# Patient Record
Sex: MALE | Race: WHITE | NOT HISPANIC OR LATINO | Employment: OTHER | URBAN - METROPOLITAN AREA
[De-identification: names, ages, dates, MRNs, and addresses within clinical notes are randomized per-mention and may not be internally consistent; named-entity substitution may affect disease eponyms.]

---

## 2018-04-06 ENCOUNTER — OFFICE VISIT CONVERTED (OUTPATIENT)
Dept: ORTHOPEDIC SURGERY | Facility: CLINIC | Age: 43
End: 2018-04-06
Attending: ORTHOPAEDIC SURGERY

## 2018-04-06 ENCOUNTER — CONVERSION ENCOUNTER (OUTPATIENT)
Dept: ORTHOPEDIC SURGERY | Facility: CLINIC | Age: 43
End: 2018-04-06

## 2018-04-16 ENCOUNTER — OFFICE VISIT CONVERTED (OUTPATIENT)
Dept: ORTHOPEDIC SURGERY | Facility: CLINIC | Age: 43
End: 2018-04-16
Attending: ORTHOPAEDIC SURGERY

## 2018-04-16 ENCOUNTER — CONVERSION ENCOUNTER (OUTPATIENT)
Dept: ORTHOPEDIC SURGERY | Facility: CLINIC | Age: 43
End: 2018-04-16

## 2018-04-25 ENCOUNTER — OFFICE VISIT CONVERTED (OUTPATIENT)
Dept: ORTHOPEDIC SURGERY | Facility: CLINIC | Age: 43
End: 2018-04-25
Attending: PHYSICIAN ASSISTANT

## 2018-06-13 ENCOUNTER — CONVERSION ENCOUNTER (OUTPATIENT)
Dept: ORTHOPEDIC SURGERY | Facility: CLINIC | Age: 43
End: 2018-06-13

## 2018-06-13 ENCOUNTER — OFFICE VISIT CONVERTED (OUTPATIENT)
Dept: ORTHOPEDIC SURGERY | Facility: CLINIC | Age: 43
End: 2018-06-13
Attending: PHYSICIAN ASSISTANT

## 2019-03-06 ENCOUNTER — OFFICE VISIT CONVERTED (OUTPATIENT)
Dept: ORTHOPEDIC SURGERY | Facility: CLINIC | Age: 44
End: 2019-03-06
Attending: ORTHOPAEDIC SURGERY

## 2019-05-09 ENCOUNTER — HOSPITAL ENCOUNTER (OUTPATIENT)
Dept: ULTRASOUND IMAGING | Facility: HOSPITAL | Age: 44
Discharge: HOME | End: 2019-05-09
Attending: INTERNAL MEDICINE

## 2019-05-09 LAB
ALBUMIN SERPL-MCNC: 5.1 G/DL (ref 3.5–5)
ALBUMIN/GLOB SERPL: 1.5 {RATIO} (ref 1.4–2.6)
ALP SERPL-CCNC: 94 U/L (ref 53–128)
ALT SERPL-CCNC: 126 U/L (ref 10–40)
ANION GAP SERPL CALC-SCNC: 19 MMOL/L (ref 8–19)
AST SERPL-CCNC: 68 U/L (ref 15–50)
BILIRUB SERPL-MCNC: 0.63 MG/DL (ref 0.2–1.3)
BUN SERPL-MCNC: 14 MG/DL (ref 5–25)
BUN/CREAT SERPL: 15 {RATIO} (ref 6–20)
CALCIUM SERPL-MCNC: 9.8 MG/DL (ref 8.7–10.4)
CHLORIDE SERPL-SCNC: 101 MMOL/L (ref 99–111)
CONV AFP: 2.3 NG/ML (ref 0–8.7)
CONV CO2: 25 MMOL/L (ref 22–32)
CONV TOTAL PROTEIN: 8.4 G/DL (ref 6.3–8.2)
CREAT UR-MCNC: 0.95 MG/DL (ref 0.7–1.2)
FERRITIN SERPL-MCNC: 467 NG/ML (ref 30–300)
GFR SERPLBLD BASED ON 1.73 SQ M-ARVRAT: >60 ML/MIN/{1.73_M2}
GLOBULIN UR ELPH-MCNC: 3.3 G/DL (ref 2–3.5)
GLUCOSE SERPL-MCNC: 94 MG/DL (ref 70–99)
INR PPP: 1.09 (ref 2–3)
OSMOLALITY SERPL CALC.SUM OF ELEC: 292 MOSM/KG (ref 273–304)
POTASSIUM SERPL-SCNC: 4 MMOL/L (ref 3.5–5.3)
PROTHROMBIN TIME: 11.1 S (ref 9.4–12)
SODIUM SERPL-SCNC: 141 MMOL/L (ref 135–147)

## 2019-05-10 LAB
CONV ANTI NUCLEAR ANTIBODY WITH REFLEX: NEGATIVE
CONV HEPATITIS B SURFACE AG W CONFIRMATION RE: NEGATIVE
HAV IGM SERPL QL IA: NEGATIVE
HBV CORE IGM SERPL QL IA: NEGATIVE
HCV AB SER DONR QL: <0.1 S/CO RATIO (ref 0–0.9)

## 2021-05-16 VITALS — BODY MASS INDEX: 33.27 KG/M2 | OXYGEN SATURATION: 97 % | HEART RATE: 83 BPM | WEIGHT: 232.37 LBS | HEIGHT: 70 IN

## 2021-05-16 VITALS — BODY MASS INDEX: 33.66 KG/M2 | WEIGHT: 235.12 LBS | HEIGHT: 70 IN | OXYGEN SATURATION: 97 % | HEART RATE: 82 BPM

## 2021-05-16 VITALS — HEIGHT: 70 IN | BODY MASS INDEX: 32.41 KG/M2 | WEIGHT: 226.37 LBS | OXYGEN SATURATION: 98 % | HEART RATE: 86 BPM

## 2021-05-16 VITALS — HEART RATE: 78 BPM | WEIGHT: 231 LBS | BODY MASS INDEX: 33.07 KG/M2 | OXYGEN SATURATION: 97 % | HEIGHT: 70 IN

## 2021-05-16 VITALS — OXYGEN SATURATION: 98 % | HEART RATE: 83 BPM | WEIGHT: 229.25 LBS | BODY MASS INDEX: 32.82 KG/M2 | HEIGHT: 70 IN

## 2021-08-22 ENCOUNTER — APPOINTMENT (OUTPATIENT)
Dept: GENERAL RADIOLOGY | Facility: HOSPITAL | Age: 46
End: 2021-08-22

## 2021-08-22 ENCOUNTER — APPOINTMENT (OUTPATIENT)
Dept: CT IMAGING | Facility: HOSPITAL | Age: 46
End: 2021-08-22

## 2021-08-22 ENCOUNTER — HOSPITAL ENCOUNTER (INPATIENT)
Facility: HOSPITAL | Age: 46
LOS: 4 days | Discharge: HOME | End: 2021-08-26
Attending: EMERGENCY MEDICINE | Admitting: INTERNAL MEDICINE

## 2021-08-22 DIAGNOSIS — S40.812A ABRASION OF LEFT UPPER EXTREMITY, INITIAL ENCOUNTER: ICD-10-CM

## 2021-08-22 DIAGNOSIS — Z78.9 DECREASED ACTIVITIES OF DAILY LIVING (ADL): ICD-10-CM

## 2021-08-22 DIAGNOSIS — S01.511A LIP LACERATION, INITIAL ENCOUNTER: ICD-10-CM

## 2021-08-22 DIAGNOSIS — M25.461 KNEE EFFUSION, RIGHT: ICD-10-CM

## 2021-08-22 DIAGNOSIS — R26.2 DIFFICULTY IN WALKING: ICD-10-CM

## 2021-08-22 DIAGNOSIS — S40.811A ABRASION OF RIGHT UPPER EXTREMITY, INITIAL ENCOUNTER: ICD-10-CM

## 2021-08-22 DIAGNOSIS — S83.91XA SPRAIN OF RIGHT KNEE, UNSPECIFIED LIGAMENT, INITIAL ENCOUNTER: ICD-10-CM

## 2021-08-22 DIAGNOSIS — S00.81XA ABRASION OF FACE, INITIAL ENCOUNTER: ICD-10-CM

## 2021-08-22 DIAGNOSIS — S72.001A CLOSED FRACTURE OF RIGHT HIP, INITIAL ENCOUNTER (HCC): Primary | ICD-10-CM

## 2021-08-22 DIAGNOSIS — S72.091A CLOSED COMMINUTED FRACTURE OF RIGHT HIP, INITIAL ENCOUNTER (HCC): ICD-10-CM

## 2021-08-22 LAB
ABO GROUP BLD: NORMAL
ABO GROUP BLD: NORMAL
ALBUMIN SERPL-MCNC: 4.9 G/DL (ref 3.5–5.2)
ALBUMIN/GLOB SERPL: 1.7 G/DL
ALP SERPL-CCNC: 93 U/L (ref 39–117)
ALT SERPL W P-5'-P-CCNC: 55 U/L (ref 1–41)
ANION GAP SERPL CALCULATED.3IONS-SCNC: 14.6 MMOL/L (ref 5–15)
APTT PPP: 18.6 SECONDS (ref 22.2–34.2)
AST SERPL-CCNC: 34 U/L (ref 1–40)
BASOPHILS # BLD AUTO: 0.13 10*3/MM3 (ref 0–0.2)
BASOPHILS NFR BLD AUTO: 1.1 % (ref 0–1.5)
BILIRUB SERPL-MCNC: 0.3 MG/DL (ref 0–1.2)
BLD GP AB SCN SERPL QL: NEGATIVE
BUN SERPL-MCNC: 12 MG/DL (ref 6–20)
BUN/CREAT SERPL: 11.8 (ref 7–25)
CALCIUM SPEC-SCNC: 10.3 MG/DL (ref 8.6–10.5)
CHLORIDE SERPL-SCNC: 104 MMOL/L (ref 98–107)
CO2 SERPL-SCNC: 21.4 MMOL/L (ref 22–29)
CREAT SERPL-MCNC: 1.02 MG/DL (ref 0.76–1.27)
DEPRECATED RDW RBC AUTO: 36.8 FL (ref 37–54)
EOSINOPHIL # BLD AUTO: 0.15 10*3/MM3 (ref 0–0.4)
EOSINOPHIL NFR BLD AUTO: 1.3 % (ref 0.3–6.2)
ERYTHROCYTE [DISTWIDTH] IN BLOOD BY AUTOMATED COUNT: 12.4 % (ref 12.3–15.4)
GFR SERPL CREATININE-BSD FRML MDRD: 79 ML/MIN/1.73
GLOBULIN UR ELPH-MCNC: 2.9 GM/DL
GLUCOSE SERPL-MCNC: 152 MG/DL (ref 65–99)
HCT VFR BLD AUTO: 44.4 % (ref 37.5–51)
HGB BLD-MCNC: 14.5 G/DL (ref 13–17.7)
IMM GRANULOCYTES # BLD AUTO: 0.27 10*3/MM3 (ref 0–0.05)
IMM GRANULOCYTES NFR BLD AUTO: 2.3 % (ref 0–0.5)
INR PPP: 0.97 (ref 2–3)
LYMPHOCYTES # BLD AUTO: 4.45 10*3/MM3 (ref 0.7–3.1)
LYMPHOCYTES NFR BLD AUTO: 37.2 % (ref 19.6–45.3)
MCH RBC QN AUTO: 26.8 PG (ref 26.6–33)
MCHC RBC AUTO-ENTMCNC: 32.7 G/DL (ref 31.5–35.7)
MCV RBC AUTO: 81.9 FL (ref 79–97)
MONOCYTES # BLD AUTO: 0.91 10*3/MM3 (ref 0.1–0.9)
MONOCYTES NFR BLD AUTO: 7.6 % (ref 5–12)
NEUTROPHILS NFR BLD AUTO: 50.5 % (ref 42.7–76)
NEUTROPHILS NFR BLD AUTO: 6.04 10*3/MM3 (ref 1.7–7)
NRBC BLD AUTO-RTO: 0 /100 WBC (ref 0–0.2)
PLATELET # BLD AUTO: 285 10*3/MM3 (ref 140–450)
PMV BLD AUTO: 10.8 FL (ref 6–12)
POTASSIUM SERPL-SCNC: 3.9 MMOL/L (ref 3.5–5.2)
PROT SERPL-MCNC: 7.8 G/DL (ref 6–8.5)
PROTHROMBIN TIME: 10.7 SECONDS (ref 9.4–12)
RBC # BLD AUTO: 5.42 10*6/MM3 (ref 4.14–5.8)
RH BLD: POSITIVE
RH BLD: POSITIVE
SODIUM SERPL-SCNC: 140 MMOL/L (ref 136–145)
T&S EXPIRATION DATE: NORMAL
WBC # BLD AUTO: 11.95 10*3/MM3 (ref 3.4–10.8)

## 2021-08-22 PROCEDURE — 85730 THROMBOPLASTIN TIME PARTIAL: CPT | Performed by: EMERGENCY MEDICINE

## 2021-08-22 PROCEDURE — 71045 X-RAY EXAM CHEST 1 VIEW: CPT

## 2021-08-22 PROCEDURE — 25010000002 HYDROMORPHONE 1 MG/ML SOLUTION: Performed by: EMERGENCY MEDICINE

## 2021-08-22 PROCEDURE — 86900 BLOOD TYPING SEROLOGIC ABO: CPT

## 2021-08-22 PROCEDURE — 99285 EMERGENCY DEPT VISIT HI MDM: CPT

## 2021-08-22 PROCEDURE — 93005 ELECTROCARDIOGRAM TRACING: CPT | Performed by: INTERNAL MEDICINE

## 2021-08-22 PROCEDURE — 73502 X-RAY EXAM HIP UNI 2-3 VIEWS: CPT

## 2021-08-22 PROCEDURE — 25010000002 MORPHINE PER 10 MG

## 2021-08-22 PROCEDURE — 86901 BLOOD TYPING SEROLOGIC RH(D): CPT | Performed by: EMERGENCY MEDICINE

## 2021-08-22 PROCEDURE — 85610 PROTHROMBIN TIME: CPT | Performed by: EMERGENCY MEDICINE

## 2021-08-22 PROCEDURE — 93005 ELECTROCARDIOGRAM TRACING: CPT | Performed by: EMERGENCY MEDICINE

## 2021-08-22 PROCEDURE — 25010000002 HYDROMORPHONE 1 MG/ML SOLUTION: Performed by: INTERNAL MEDICINE

## 2021-08-22 PROCEDURE — 73562 X-RAY EXAM OF KNEE 3: CPT

## 2021-08-22 PROCEDURE — 72192 CT PELVIS W/O DYE: CPT | Performed by: RADIOLOGY

## 2021-08-22 PROCEDURE — 25010000002 MORPHINE PER 10 MG: Performed by: EMERGENCY MEDICINE

## 2021-08-22 PROCEDURE — 86901 BLOOD TYPING SEROLOGIC RH(D): CPT

## 2021-08-22 PROCEDURE — 72192 CT PELVIS W/O DYE: CPT

## 2021-08-22 PROCEDURE — 86850 RBC ANTIBODY SCREEN: CPT | Performed by: EMERGENCY MEDICINE

## 2021-08-22 PROCEDURE — 73562 X-RAY EXAM OF KNEE 3: CPT | Performed by: RADIOLOGY

## 2021-08-22 PROCEDURE — 86900 BLOOD TYPING SEROLOGIC ABO: CPT | Performed by: EMERGENCY MEDICINE

## 2021-08-22 PROCEDURE — 85025 COMPLETE CBC W/AUTO DIFF WBC: CPT | Performed by: EMERGENCY MEDICINE

## 2021-08-22 PROCEDURE — 94799 UNLISTED PULMONARY SVC/PX: CPT

## 2021-08-22 PROCEDURE — 25010000002 ENOXAPARIN PER 10 MG: Performed by: INTERNAL MEDICINE

## 2021-08-22 PROCEDURE — 80053 COMPREHEN METABOLIC PANEL: CPT | Performed by: EMERGENCY MEDICINE

## 2021-08-22 PROCEDURE — 73502 X-RAY EXAM HIP UNI 2-3 VIEWS: CPT | Performed by: RADIOLOGY

## 2021-08-22 RX ORDER — SODIUM CHLORIDE 0.9 % (FLUSH) 0.9 %
10 SYRINGE (ML) INJECTION AS NEEDED
Status: DISCONTINUED | OUTPATIENT
Start: 2021-08-22 | End: 2021-08-26 | Stop reason: HOSPADM

## 2021-08-22 RX ORDER — ACETAMINOPHEN 325 MG/1
650 TABLET ORAL EVERY 4 HOURS PRN
Status: DISCONTINUED | OUTPATIENT
Start: 2021-08-22 | End: 2021-08-26 | Stop reason: HOSPADM

## 2021-08-22 RX ORDER — SODIUM CHLORIDE 0.9 % (FLUSH) 0.9 %
10 SYRINGE (ML) INJECTION EVERY 12 HOURS SCHEDULED
Status: DISCONTINUED | OUTPATIENT
Start: 2021-08-22 | End: 2021-08-26 | Stop reason: HOSPADM

## 2021-08-22 RX ORDER — BACITRACIN ZINC 500 [USP'U]/G
OINTMENT TOPICAL EVERY 8 HOURS SCHEDULED
Status: DISCONTINUED | OUTPATIENT
Start: 2021-08-22 | End: 2021-08-26 | Stop reason: HOSPADM

## 2021-08-22 RX ADMIN — Medication 4 MG: at 15:31

## 2021-08-22 RX ADMIN — SODIUM CHLORIDE, PRESERVATIVE FREE 10 ML: 5 INJECTION INTRAVENOUS at 17:33

## 2021-08-22 RX ADMIN — HYDROMORPHONE HYDROCHLORIDE 1 MG: 1 INJECTION, SOLUTION INTRAMUSCULAR; INTRAVENOUS; SUBCUTANEOUS at 17:32

## 2021-08-22 RX ADMIN — ENOXAPARIN SODIUM 40 MG: 40 INJECTION SUBCUTANEOUS at 22:21

## 2021-08-22 RX ADMIN — MORPHINE SULFATE 4 MG: 4 INJECTION, SOLUTION INTRAMUSCULAR; INTRAVENOUS at 16:56

## 2021-08-22 RX ADMIN — MORPHINE SULFATE 4 MG: 4 INJECTION, SOLUTION INTRAMUSCULAR; INTRAVENOUS at 15:31

## 2021-08-22 RX ADMIN — SODIUM CHLORIDE 1000 ML: 9 INJECTION, SOLUTION INTRAVENOUS at 16:05

## 2021-08-22 RX ADMIN — HYDROMORPHONE HYDROCHLORIDE 1 MG: 1 INJECTION, SOLUTION INTRAMUSCULAR; INTRAVENOUS; SUBCUTANEOUS at 22:08

## 2021-08-23 ENCOUNTER — ANESTHESIA (OUTPATIENT)
Dept: PERIOP | Facility: HOSPITAL | Age: 46
End: 2021-08-23

## 2021-08-23 ENCOUNTER — APPOINTMENT (OUTPATIENT)
Dept: GENERAL RADIOLOGY | Facility: HOSPITAL | Age: 46
End: 2021-08-23

## 2021-08-23 ENCOUNTER — ANESTHESIA EVENT (OUTPATIENT)
Dept: PERIOP | Facility: HOSPITAL | Age: 46
End: 2021-08-23

## 2021-08-23 LAB
ALBUMIN SERPL-MCNC: 4.3 G/DL (ref 3.5–5.2)
ALBUMIN/GLOB SERPL: 1.5 G/DL
ALP SERPL-CCNC: 81 U/L (ref 39–117)
ALT SERPL W P-5'-P-CCNC: 49 U/L (ref 1–41)
ANION GAP SERPL CALCULATED.3IONS-SCNC: 12.8 MMOL/L (ref 5–15)
AST SERPL-CCNC: 26 U/L (ref 1–40)
BASOPHILS # BLD AUTO: 0.08 10*3/MM3 (ref 0–0.2)
BASOPHILS NFR BLD AUTO: 0.6 % (ref 0–1.5)
BILIRUB SERPL-MCNC: 0.3 MG/DL (ref 0–1.2)
BUN SERPL-MCNC: 14 MG/DL (ref 6–20)
BUN/CREAT SERPL: 15.2 (ref 7–25)
CALCIUM SPEC-SCNC: 9.2 MG/DL (ref 8.6–10.5)
CHLORIDE SERPL-SCNC: 101 MMOL/L (ref 98–107)
CO2 SERPL-SCNC: 21.2 MMOL/L (ref 22–29)
CREAT SERPL-MCNC: 0.92 MG/DL (ref 0.76–1.27)
DEPRECATED RDW RBC AUTO: 38.4 FL (ref 37–54)
EOSINOPHIL # BLD AUTO: 0.09 10*3/MM3 (ref 0–0.4)
EOSINOPHIL NFR BLD AUTO: 0.7 % (ref 0.3–6.2)
ERYTHROCYTE [DISTWIDTH] IN BLOOD BY AUTOMATED COUNT: 12.8 % (ref 12.3–15.4)
GFR SERPL CREATININE-BSD FRML MDRD: 89 ML/MIN/1.73
GLOBULIN UR ELPH-MCNC: 2.8 GM/DL
GLUCOSE SERPL-MCNC: 184 MG/DL (ref 65–99)
HCT VFR BLD AUTO: 38.1 % (ref 37.5–51)
HCT VFR BLD AUTO: 39.3 % (ref 37.5–51)
HGB BLD-MCNC: 12.4 G/DL (ref 13–17.7)
HGB BLD-MCNC: 13 G/DL (ref 13–17.7)
IMM GRANULOCYTES # BLD AUTO: 0.11 10*3/MM3 (ref 0–0.05)
IMM GRANULOCYTES NFR BLD AUTO: 0.8 % (ref 0–0.5)
LYMPHOCYTES # BLD AUTO: 2.9 10*3/MM3 (ref 0.7–3.1)
LYMPHOCYTES NFR BLD AUTO: 21.2 % (ref 19.6–45.3)
MCH RBC QN AUTO: 27.2 PG (ref 26.6–33)
MCHC RBC AUTO-ENTMCNC: 33.1 G/DL (ref 31.5–35.7)
MCV RBC AUTO: 82.2 FL (ref 79–97)
MONOCYTES # BLD AUTO: 1.32 10*3/MM3 (ref 0.1–0.9)
MONOCYTES NFR BLD AUTO: 9.6 % (ref 5–12)
NEUTROPHILS NFR BLD AUTO: 67.1 % (ref 42.7–76)
NEUTROPHILS NFR BLD AUTO: 9.2 10*3/MM3 (ref 1.7–7)
NRBC BLD AUTO-RTO: 0 /100 WBC (ref 0–0.2)
PLATELET # BLD AUTO: 213 10*3/MM3 (ref 140–450)
PMV BLD AUTO: 9.4 FL (ref 6–12)
POTASSIUM SERPL-SCNC: 4.1 MMOL/L (ref 3.5–5.2)
PROT SERPL-MCNC: 7.1 G/DL (ref 6–8.5)
QT INTERVAL: 334 MS
QT INTERVAL: 342 MS
RBC # BLD AUTO: 4.78 10*6/MM3 (ref 4.14–5.8)
SODIUM SERPL-SCNC: 135 MMOL/L (ref 136–145)
WBC # BLD AUTO: 13.7 10*3/MM3 (ref 3.4–10.8)

## 2021-08-23 PROCEDURE — 25010000002 HYDROMORPHONE PER 4 MG: Performed by: NURSE ANESTHETIST, CERTIFIED REGISTERED

## 2021-08-23 PROCEDURE — 73502 X-RAY EXAM HIP UNI 2-3 VIEWS: CPT

## 2021-08-23 PROCEDURE — 76000 FLUOROSCOPY <1 HR PHYS/QHP: CPT

## 2021-08-23 PROCEDURE — 80053 COMPREHEN METABOLIC PANEL: CPT | Performed by: INTERNAL MEDICINE

## 2021-08-23 PROCEDURE — 85018 HEMOGLOBIN: CPT | Performed by: ORTHOPAEDIC SURGERY

## 2021-08-23 PROCEDURE — C1713 ANCHOR/SCREW BN/BN,TIS/BN: HCPCS | Performed by: ORTHOPAEDIC SURGERY

## 2021-08-23 PROCEDURE — 73562 X-RAY EXAM OF KNEE 3: CPT

## 2021-08-23 PROCEDURE — 25010000002 HYDROMORPHONE 1 MG/ML SOLUTION: Performed by: INTERNAL MEDICINE

## 2021-08-23 PROCEDURE — C1769 GUIDE WIRE: HCPCS | Performed by: ORTHOPAEDIC SURGERY

## 2021-08-23 PROCEDURE — 25010000002 MORPHINE PER 10 MG: Performed by: INTERNAL MEDICINE

## 2021-08-23 PROCEDURE — 25010000002 DEXAMETHASONE PER 1 MG: Performed by: NURSE ANESTHETIST, CERTIFIED REGISTERED

## 2021-08-23 PROCEDURE — 25010000002 ONDANSETRON PER 1 MG: Performed by: NURSE ANESTHETIST, CERTIFIED REGISTERED

## 2021-08-23 PROCEDURE — 25010000002 MIDAZOLAM PER 1MG: Performed by: ANESTHESIOLOGY

## 2021-08-23 PROCEDURE — 27236 TREAT THIGH FRACTURE: CPT | Performed by: ORTHOPAEDIC SURGERY

## 2021-08-23 PROCEDURE — 85025 COMPLETE CBC W/AUTO DIFF WBC: CPT | Performed by: INTERNAL MEDICINE

## 2021-08-23 PROCEDURE — 25010000002 PROPOFOL 10 MG/ML EMULSION: Performed by: NURSE ANESTHETIST, CERTIFIED REGISTERED

## 2021-08-23 PROCEDURE — 0QS604Z REPOSITION RIGHT UPPER FEMUR WITH INTERNAL FIXATION DEVICE, OPEN APPROACH: ICD-10-PCS | Performed by: ORTHOPAEDIC SURGERY

## 2021-08-23 PROCEDURE — 94799 UNLISTED PULMONARY SVC/PX: CPT

## 2021-08-23 PROCEDURE — 85014 HEMATOCRIT: CPT | Performed by: ORTHOPAEDIC SURGERY

## 2021-08-23 PROCEDURE — 99254 IP/OBS CNSLTJ NEW/EST MOD 60: CPT | Performed by: ORTHOPAEDIC SURGERY

## 2021-08-23 PROCEDURE — 25010000002 HYDROMORPHONE 1 MG/ML SOLUTION: Performed by: NURSE ANESTHETIST, CERTIFIED REGISTERED

## 2021-08-23 DEVICE — ZNN CMN NAIL 10MMX21.5CM 125R
Type: IMPLANTABLE DEVICE | Site: HIP | Status: NON-FUNCTIONAL
Brand: ZIMMER® NATURAL NAIL® SYSTEM
Removed: 2022-05-11

## 2021-08-23 DEVICE — SCRW CORT FA FUL/THRD HEX3.5 TI 5X32.5MM
Type: IMPLANTABLE DEVICE | Site: HIP | Status: NON-FUNCTIONAL
Removed: 2022-05-11

## 2021-08-23 RX ORDER — CLINDAMYCIN PHOSPHATE 900 MG/50ML
900 INJECTION INTRAVENOUS
Status: DISCONTINUED | OUTPATIENT
Start: 2021-08-23 | End: 2021-08-23 | Stop reason: HOSPADM

## 2021-08-23 RX ORDER — CLINDAMYCIN PHOSPHATE 150 MG/ML
INJECTION, SOLUTION INTRAVENOUS AS NEEDED
Status: DISCONTINUED | OUTPATIENT
Start: 2021-08-23 | End: 2021-08-23

## 2021-08-23 RX ORDER — ROCURONIUM BROMIDE 10 MG/ML
INJECTION, SOLUTION INTRAVENOUS AS NEEDED
Status: DISCONTINUED | OUTPATIENT
Start: 2021-08-23 | End: 2021-08-23 | Stop reason: SURG

## 2021-08-23 RX ORDER — ONDANSETRON 4 MG/1
4 TABLET, FILM COATED ORAL EVERY 6 HOURS PRN
Status: DISCONTINUED | OUTPATIENT
Start: 2021-08-23 | End: 2021-08-26 | Stop reason: HOSPADM

## 2021-08-23 RX ORDER — PROPOFOL 10 MG/ML
VIAL (ML) INTRAVENOUS AS NEEDED
Status: DISCONTINUED | OUTPATIENT
Start: 2021-08-23 | End: 2021-08-23 | Stop reason: SURG

## 2021-08-23 RX ORDER — CLINDAMYCIN PHOSPHATE 150 MG/ML
INJECTION, SOLUTION INTRAVENOUS AS NEEDED
Status: DISCONTINUED | OUTPATIENT
Start: 2021-08-23 | End: 2021-08-23 | Stop reason: SURG

## 2021-08-23 RX ORDER — PROMETHAZINE HYDROCHLORIDE 12.5 MG/1
25 TABLET ORAL ONCE AS NEEDED
Status: DISCONTINUED | OUTPATIENT
Start: 2021-08-23 | End: 2021-08-23 | Stop reason: HOSPADM

## 2021-08-23 RX ORDER — ACETAMINOPHEN 325 MG/1
325 TABLET ORAL EVERY 4 HOURS PRN
Status: DISCONTINUED | OUTPATIENT
Start: 2021-08-23 | End: 2021-08-26 | Stop reason: HOSPADM

## 2021-08-23 RX ORDER — SODIUM CHLORIDE 0.9 % (FLUSH) 0.9 %
10 SYRINGE (ML) INJECTION AS NEEDED
Status: DISCONTINUED | OUTPATIENT
Start: 2021-08-23 | End: 2021-08-26 | Stop reason: HOSPADM

## 2021-08-23 RX ORDER — CLINDAMYCIN PHOSPHATE 900 MG/50ML
900 INJECTION INTRAVENOUS
Status: DISCONTINUED | OUTPATIENT
Start: 2021-08-24 | End: 2021-08-23

## 2021-08-23 RX ORDER — HYDROMORPHONE HCL 110MG/55ML
PATIENT CONTROLLED ANALGESIA SYRINGE INTRAVENOUS AS NEEDED
Status: DISCONTINUED | OUTPATIENT
Start: 2021-08-23 | End: 2021-08-23 | Stop reason: SURG

## 2021-08-23 RX ORDER — KETAMINE HYDROCHLORIDE 50 MG/ML
INJECTION, SOLUTION, CONCENTRATE INTRAMUSCULAR; INTRAVENOUS AS NEEDED
Status: DISCONTINUED | OUTPATIENT
Start: 2021-08-23 | End: 2021-08-23 | Stop reason: SURG

## 2021-08-23 RX ORDER — ONDANSETRON 2 MG/ML
4 INJECTION INTRAMUSCULAR; INTRAVENOUS ONCE AS NEEDED
Status: DISCONTINUED | OUTPATIENT
Start: 2021-08-23 | End: 2021-08-23

## 2021-08-23 RX ORDER — NALOXONE HCL 0.4 MG/ML
0.1 VIAL (ML) INJECTION
Status: DISCONTINUED | OUTPATIENT
Start: 2021-08-23 | End: 2021-08-26 | Stop reason: HOSPADM

## 2021-08-23 RX ORDER — OXYCODONE AND ACETAMINOPHEN 7.5; 325 MG/1; MG/1
2 TABLET ORAL EVERY 4 HOURS PRN
Status: DISCONTINUED | OUTPATIENT
Start: 2021-08-23 | End: 2021-08-26 | Stop reason: HOSPADM

## 2021-08-23 RX ORDER — CLINDAMYCIN PHOSPHATE 900 MG/50ML
900 INJECTION INTRAVENOUS EVERY 8 HOURS
Status: COMPLETED | OUTPATIENT
Start: 2021-08-24 | End: 2021-08-24

## 2021-08-23 RX ORDER — MEPERIDINE HYDROCHLORIDE 25 MG/ML
12.5 INJECTION INTRAMUSCULAR; INTRAVENOUS; SUBCUTANEOUS
Status: DISCONTINUED | OUTPATIENT
Start: 2021-08-23 | End: 2021-08-23 | Stop reason: HOSPADM

## 2021-08-23 RX ORDER — SODIUM CHLORIDE 0.9 % (FLUSH) 0.9 %
10 SYRINGE (ML) INJECTION AS NEEDED
Status: DISCONTINUED | OUTPATIENT
Start: 2021-08-23 | End: 2021-08-23 | Stop reason: HOSPADM

## 2021-08-23 RX ORDER — ACETAMINOPHEN 500 MG
1000 TABLET ORAL EVERY 8 HOURS
Status: DISPENSED | OUTPATIENT
Start: 2021-08-23 | End: 2021-08-25

## 2021-08-23 RX ORDER — OXYCODONE AND ACETAMINOPHEN 7.5; 325 MG/1; MG/1
1 TABLET ORAL EVERY 4 HOURS PRN
Status: DISCONTINUED | OUTPATIENT
Start: 2021-08-23 | End: 2021-08-26 | Stop reason: HOSPADM

## 2021-08-23 RX ORDER — LIDOCAINE HYDROCHLORIDE 20 MG/ML
INJECTION, SOLUTION INFILTRATION; PERINEURAL AS NEEDED
Status: DISCONTINUED | OUTPATIENT
Start: 2021-08-23 | End: 2021-08-23 | Stop reason: SURG

## 2021-08-23 RX ORDER — DOCUSATE SODIUM 100 MG/1
100 CAPSULE, LIQUID FILLED ORAL 2 TIMES DAILY PRN
Status: DISCONTINUED | OUTPATIENT
Start: 2021-08-23 | End: 2021-08-26 | Stop reason: HOSPADM

## 2021-08-23 RX ORDER — DEXAMETHASONE SODIUM PHOSPHATE 4 MG/ML
INJECTION, SOLUTION INTRA-ARTICULAR; INTRALESIONAL; INTRAMUSCULAR; INTRAVENOUS; SOFT TISSUE AS NEEDED
Status: DISCONTINUED | OUTPATIENT
Start: 2021-08-23 | End: 2021-08-23 | Stop reason: SURG

## 2021-08-23 RX ORDER — SODIUM CHLORIDE, SODIUM LACTATE, POTASSIUM CHLORIDE, CALCIUM CHLORIDE 600; 310; 30; 20 MG/100ML; MG/100ML; MG/100ML; MG/100ML
9 INJECTION, SOLUTION INTRAVENOUS CONTINUOUS PRN
Status: DISCONTINUED | OUTPATIENT
Start: 2021-08-23 | End: 2021-08-26 | Stop reason: HOSPADM

## 2021-08-23 RX ORDER — PROMETHAZINE HYDROCHLORIDE 12.5 MG/1
12.5 SUPPOSITORY RECTAL EVERY 6 HOURS PRN
Status: DISCONTINUED | OUTPATIENT
Start: 2021-08-23 | End: 2021-08-23

## 2021-08-23 RX ORDER — DEXMEDETOMIDINE HYDROCHLORIDE 100 UG/ML
INJECTION, SOLUTION INTRAVENOUS AS NEEDED
Status: DISCONTINUED | OUTPATIENT
Start: 2021-08-23 | End: 2021-08-23 | Stop reason: SURG

## 2021-08-23 RX ORDER — ACETAMINOPHEN 500 MG
1000 TABLET ORAL ONCE
Status: COMPLETED | OUTPATIENT
Start: 2021-08-23 | End: 2021-08-23

## 2021-08-23 RX ORDER — MAGNESIUM HYDROXIDE 1200 MG/15ML
LIQUID ORAL AS NEEDED
Status: DISCONTINUED | OUTPATIENT
Start: 2021-08-23 | End: 2021-08-23 | Stop reason: HOSPADM

## 2021-08-23 RX ORDER — MIDAZOLAM HYDROCHLORIDE 2 MG/2ML
2 INJECTION, SOLUTION INTRAMUSCULAR; INTRAVENOUS ONCE
Status: COMPLETED | OUTPATIENT
Start: 2021-08-23 | End: 2021-08-23

## 2021-08-23 RX ORDER — SODIUM CHLORIDE 0.9 % (FLUSH) 0.9 %
10 SYRINGE (ML) INJECTION EVERY 12 HOURS SCHEDULED
Status: DISCONTINUED | OUTPATIENT
Start: 2021-08-23 | End: 2021-08-23 | Stop reason: HOSPADM

## 2021-08-23 RX ORDER — PROMETHAZINE HYDROCHLORIDE 12.5 MG/1
12.5 TABLET ORAL EVERY 6 HOURS PRN
Status: DISCONTINUED | OUTPATIENT
Start: 2021-08-23 | End: 2021-08-23

## 2021-08-23 RX ORDER — HYDROMORPHONE HCL 110MG/55ML
0.5 PATIENT CONTROLLED ANALGESIA SYRINGE INTRAVENOUS
Status: DISCONTINUED | OUTPATIENT
Start: 2021-08-23 | End: 2021-08-26 | Stop reason: HOSPADM

## 2021-08-23 RX ORDER — SODIUM CHLORIDE, SODIUM LACTATE, POTASSIUM CHLORIDE, CALCIUM CHLORIDE 600; 310; 30; 20 MG/100ML; MG/100ML; MG/100ML; MG/100ML
100 INJECTION, SOLUTION INTRAVENOUS CONTINUOUS
Status: DISCONTINUED | OUTPATIENT
Start: 2021-08-23 | End: 2021-08-25

## 2021-08-23 RX ORDER — PROMETHAZINE HYDROCHLORIDE 25 MG/1
25 SUPPOSITORY RECTAL ONCE AS NEEDED
Status: DISCONTINUED | OUTPATIENT
Start: 2021-08-23 | End: 2021-08-23 | Stop reason: HOSPADM

## 2021-08-23 RX ORDER — SODIUM CHLORIDE 0.9 % (FLUSH) 0.9 %
3 SYRINGE (ML) INJECTION EVERY 12 HOURS SCHEDULED
Status: DISCONTINUED | OUTPATIENT
Start: 2021-08-23 | End: 2021-08-26 | Stop reason: HOSPADM

## 2021-08-23 RX ORDER — ONDANSETRON 2 MG/ML
4 INJECTION INTRAMUSCULAR; INTRAVENOUS EVERY 6 HOURS PRN
Status: DISCONTINUED | OUTPATIENT
Start: 2021-08-23 | End: 2021-08-26 | Stop reason: HOSPADM

## 2021-08-23 RX ORDER — OXYCODONE HYDROCHLORIDE 5 MG/1
5 TABLET ORAL
Status: DISCONTINUED | OUTPATIENT
Start: 2021-08-23 | End: 2021-08-23 | Stop reason: HOSPADM

## 2021-08-23 RX ORDER — ONDANSETRON 2 MG/ML
INJECTION INTRAMUSCULAR; INTRAVENOUS AS NEEDED
Status: DISCONTINUED | OUTPATIENT
Start: 2021-08-23 | End: 2021-08-23 | Stop reason: SURG

## 2021-08-23 RX ADMIN — MORPHINE SULFATE 4 MG: 4 INJECTION, SOLUTION INTRAMUSCULAR; INTRAVENOUS at 05:12

## 2021-08-23 RX ADMIN — SODIUM CHLORIDE, POTASSIUM CHLORIDE, SODIUM LACTATE AND CALCIUM CHLORIDE 100 ML/HR: 600; 310; 30; 20 INJECTION, SOLUTION INTRAVENOUS at 22:45

## 2021-08-23 RX ADMIN — DEXMEDETOMIDINE HYDROCHLORIDE 10 MCG: 100 INJECTION, SOLUTION INTRAVENOUS at 19:21

## 2021-08-23 RX ADMIN — KETAMINE HYDROCHLORIDE 20 MG: 50 INJECTION, SOLUTION INTRAMUSCULAR; INTRAVENOUS at 19:03

## 2021-08-23 RX ADMIN — ACETAMINOPHEN 1000 MG: 500 TABLET ORAL at 17:46

## 2021-08-23 RX ADMIN — Medication: at 05:38

## 2021-08-23 RX ADMIN — LIDOCAINE HYDROCHLORIDE 100 MG: 20 INJECTION, SOLUTION INFILTRATION; PERINEURAL at 18:51

## 2021-08-23 RX ADMIN — HYDROMORPHONE HYDROCHLORIDE 1 MG: 2 INJECTION, SOLUTION INTRAMUSCULAR; INTRAVENOUS; SUBCUTANEOUS at 19:37

## 2021-08-23 RX ADMIN — Medication: at 22:23

## 2021-08-23 RX ADMIN — HYDROMORPHONE HYDROCHLORIDE 0.25 MG: 1 INJECTION, SOLUTION INTRAMUSCULAR; INTRAVENOUS; SUBCUTANEOUS at 21:22

## 2021-08-23 RX ADMIN — ROCURONIUM BROMIDE 20 MG: 10 INJECTION INTRAVENOUS at 19:35

## 2021-08-23 RX ADMIN — SODIUM CHLORIDE, PRESERVATIVE FREE 10 ML: 5 INJECTION INTRAVENOUS at 08:28

## 2021-08-23 RX ADMIN — MIDAZOLAM HYDROCHLORIDE 2 MG: 1 INJECTION, SOLUTION INTRAMUSCULAR; INTRAVENOUS at 17:47

## 2021-08-23 RX ADMIN — SODIUM CHLORIDE, POTASSIUM CHLORIDE, SODIUM LACTATE AND CALCIUM CHLORIDE 9 ML/HR: 600; 310; 30; 20 INJECTION, SOLUTION INTRAVENOUS at 17:47

## 2021-08-23 RX ADMIN — ROCURONIUM BROMIDE 40 MG: 10 INJECTION INTRAVENOUS at 18:54

## 2021-08-23 RX ADMIN — HYDROMORPHONE HYDROCHLORIDE 1 MG: 1 INJECTION, SOLUTION INTRAMUSCULAR; INTRAVENOUS; SUBCUTANEOUS at 08:28

## 2021-08-23 RX ADMIN — CLINDAMYCIN PHOSPHATE 900 MG: 150 INJECTION, SOLUTION INTRAVENOUS at 18:47

## 2021-08-23 RX ADMIN — ROCURONIUM BROMIDE 10 MG: 10 INJECTION INTRAVENOUS at 19:03

## 2021-08-23 RX ADMIN — ACETAMINOPHEN 1000 MG: 500 TABLET ORAL at 22:21

## 2021-08-23 RX ADMIN — DEXAMETHASONE SODIUM PHOSPHATE 4 MG: 4 INJECTION INTRA-ARTICULAR; INTRALESIONAL; INTRAMUSCULAR; INTRAVENOUS; SOFT TISSUE at 19:11

## 2021-08-23 RX ADMIN — SODIUM CHLORIDE, POTASSIUM CHLORIDE, SODIUM LACTATE AND CALCIUM CHLORIDE: 600; 310; 30; 20 INJECTION, SOLUTION INTRAVENOUS at 19:38

## 2021-08-23 RX ADMIN — MORPHINE SULFATE 4 MG: 4 INJECTION, SOLUTION INTRAMUSCULAR; INTRAVENOUS at 12:38

## 2021-08-23 RX ADMIN — ONDANSETRON 4 MG: 2 INJECTION INTRAMUSCULAR; INTRAVENOUS at 19:15

## 2021-08-23 RX ADMIN — KETAMINE HYDROCHLORIDE 10 MG: 50 INJECTION, SOLUTION INTRAMUSCULAR; INTRAVENOUS at 18:54

## 2021-08-23 RX ADMIN — Medication: at 01:33

## 2021-08-23 RX ADMIN — DEXMEDETOMIDINE HYDROCHLORIDE 10 MCG: 100 INJECTION, SOLUTION INTRAVENOUS at 19:16

## 2021-08-23 RX ADMIN — OXYCODONE HYDROCHLORIDE 5 MG: 5 TABLET ORAL at 21:32

## 2021-08-23 RX ADMIN — KETAMINE HYDROCHLORIDE 20 MG: 50 INJECTION, SOLUTION INTRAMUSCULAR; INTRAVENOUS at 18:56

## 2021-08-23 RX ADMIN — PROPOFOL 300 MG: 10 INJECTION, EMULSION INTRAVENOUS at 18:53

## 2021-08-23 RX ADMIN — HYDROMORPHONE HYDROCHLORIDE 1 MG: 1 INJECTION, SOLUTION INTRAMUSCULAR; INTRAVENOUS; SUBCUTANEOUS at 16:41

## 2021-08-23 RX ADMIN — HYDROMORPHONE HYDROCHLORIDE 1 MG: 1 INJECTION, SOLUTION INTRAMUSCULAR; INTRAVENOUS; SUBCUTANEOUS at 02:45

## 2021-08-23 RX ADMIN — SODIUM CHLORIDE, PRESERVATIVE FREE 10 ML: 5 INJECTION INTRAVENOUS at 01:35

## 2021-08-23 NOTE — ANESTHESIA PREPROCEDURE EVALUATION
Anesthesia Evaluation     Patient summary reviewed and Nursing notes reviewed   no history of anesthetic complications:  NPO Solid Status: > 8 hours  NPO Liquid Status: > 2 hours           Airway   Dental      Pulmonary - negative pulmonary ROS and normal exam   Cardiovascular - negative cardio ROS and normal exam  Exercise tolerance: good (4-7 METS)        Neuro/Psych- negative ROS  GI/Hepatic/Renal/Endo - negative ROS     Musculoskeletal (-) negative ROS    Abdominal  - normal exam   Substance History - negative use     OB/GYN negative ob/gyn ROS         Other - negative ROS                  [facility]   Consult Note    Patient Name: Socrates Owen  : 1975  MRN: [id number]  Primary Care Physician:  Provider, No Known  Referring Physician: No ref. provider found  Date of admission: (Not on file)    Subjective   Subjective     Reason for Consult/ Chief Complaint: Right hip pain    HPI:  Socrates Owen is a 46 y.o. male he was skydiving yesterday when he had a hard landing after his parachute deployed. He landed on the right side on his feet. He bounded forward and injured his hands and face with abrasions. He was brought to the emergency department and found to have a right proximal femur fracture. He also reports right knee pain and had a previous meniscectomy a few years ago. He reports pain in the right hip. No head injury or loss of consciousness. He has been n.p.o. after midnight.    Review of Systems   All systems were reviewed and negative except for those mentioned in HPI    Personal History     Past Medical History:   Diagnosis Date   • Elevated cholesterol        Past Surgical History:   Procedure Laterality Date   • KNEE SURGERY         Family History: family history includes Aortic stenosis in his mother; Heart disease in his father. Otherwise pertinent FHx was reviewed and not pertinent to current issue.    Social History:  reports that he has never smoked. He has never used smokeless  tobacco. He reports that he does not drink alcohol and does not use drugs.    Home Medications:       Allergies:  Allergies   Allergen Reactions   • Keflex [Cephalexin] Hives       Objective    Objective     Vitals:   Temp:  [36.6 °C (97.9 °F)-37.5 °C (99.5 °F)] 37.1 °C (98.8 °F)  Heart Rate:  [76-93] 90  Resp:  [18] 18  BP: (146-164)/() 164/98    Physical Exam:   Constitutional: Awake, alert   Eyes: PERRLA, sclerae anicteric, no conjunctival injection   HENT: NCAT, mucous membranes moist   Neck: Supple, no thyromegaly, no lymphadenopathy, trachea midline   Respiratory: Clear to auscultation bilaterally, nonlabored respirations    Cardiovascular: RRR, no murmurs, rubs, or gallops, palpable pedal pulses bilaterally   Gastrointestinal: Positive bowel sounds, soft, nontender, nondistended   Musculoskeletal: Right hip is shortened and externally rotated. Neurovascularly intact. Pain with any movement of the hip. Positive EHL, FHL, GS, TA. Sensation intact to light touch all 5 nerves the foot. Positive pulses. Tender to palpation knee. Pain with knee range of motion.   Psychiatric: Appropriate affect, cooperative   Neurologic: Oriented x 3, strength symmetric in all extremities, Cranial Nerves grossly intact to confrontation, speech clear   Skin: No rashes     Result Review    Result Review:  I have personally reviewed the results from the time of this admission to 8/23/2021 16:01 EDT and agree with these findings:  []  Laboratory  []  Microbiology  [x]  Radiology  []  EKG/Telemetry   []  Cardiology/Vascular   []  Pathology  []  Old records  []  Other:    Most notable findings include: CT and x-ray of the right hip showing complex proximal femur fracture involving the femoral head neck and intertrochanteric region  Assessment/Plan   Assessment / Plan     Brief Patient Summary:  Socrates Owen is a 46 y.o. male who has complex right proximal femur fracture involving the femoral head, neck, intertrochanteric  region. Right knee injury.    Active Hospital Problems:  There are no active hospital problems to display for this patient.      Plan: We discussed treatment options with the patient. I recommended open reduction internal fixation of the right proximal femur. Risks and benefits of surgery were discussed. Given his age I would not consider arthroplasty at this time. However we did discuss that there is involvement of the joint and there is possibility he could develop avascular necrosis or posttraumatic arthritis. He expressed understanding of this. Plan for n.p.o. today and surgery later today. Thank you for the consultation.        Electronically signed by Peterson Michelle MD, 08/23/21, 7:04 AM EDT.         Anesthesia Plan    ASA 1 - emergent     general   (Patient understands anesthesia not responsible for dental damage.)  intravenous induction     Anesthetic plan, all risks, benefits, and alternatives have been provided, discussed and informed consent has been obtained with: patient.  Use of blood products discussed with patient .   Plan discussed with CRNA.

## 2021-08-24 LAB
ANION GAP SERPL CALCULATED.3IONS-SCNC: 14 MMOL/L (ref 5–15)
BUN SERPL-MCNC: 14 MG/DL (ref 6–20)
BUN/CREAT SERPL: 16.7 (ref 7–25)
CALCIUM SPEC-SCNC: 8.9 MG/DL (ref 8.6–10.5)
CHLORIDE SERPL-SCNC: 99 MMOL/L (ref 98–107)
CO2 SERPL-SCNC: 21 MMOL/L (ref 22–29)
CREAT SERPL-MCNC: 0.84 MG/DL (ref 0.76–1.27)
GFR SERPL CREATININE-BSD FRML MDRD: 99 ML/MIN/1.73
GLUCOSE SERPL-MCNC: 274 MG/DL (ref 65–99)
HCT VFR BLD AUTO: 37.2 % (ref 37.5–51)
HGB BLD-MCNC: 12 G/DL (ref 13–17.7)
POTASSIUM SERPL-SCNC: 4.3 MMOL/L (ref 3.5–5.2)
SODIUM SERPL-SCNC: 134 MMOL/L (ref 136–145)

## 2021-08-24 PROCEDURE — 80048 BASIC METABOLIC PNL TOTAL CA: CPT | Performed by: ORTHOPAEDIC SURGERY

## 2021-08-24 PROCEDURE — 85014 HEMATOCRIT: CPT | Performed by: ORTHOPAEDIC SURGERY

## 2021-08-24 PROCEDURE — 85018 HEMOGLOBIN: CPT | Performed by: ORTHOPAEDIC SURGERY

## 2021-08-24 PROCEDURE — 97161 PT EVAL LOW COMPLEX 20 MIN: CPT

## 2021-08-24 PROCEDURE — 25010000002 ENOXAPARIN PER 10 MG: Performed by: ORTHOPAEDIC SURGERY

## 2021-08-24 PROCEDURE — 97530 THERAPEUTIC ACTIVITIES: CPT

## 2021-08-24 RX ADMIN — ACETAMINOPHEN 1000 MG: 500 TABLET ORAL at 05:31

## 2021-08-24 RX ADMIN — Medication: at 05:33

## 2021-08-24 RX ADMIN — Medication: at 22:51

## 2021-08-24 RX ADMIN — ENOXAPARIN SODIUM 40 MG: 40 INJECTION SUBCUTANEOUS at 08:42

## 2021-08-24 RX ADMIN — CLINDAMYCIN PHOSPHATE 900 MG: 900 INJECTION, SOLUTION INTRAVENOUS at 02:28

## 2021-08-24 RX ADMIN — CLINDAMYCIN PHOSPHATE 900 MG: 900 INJECTION, SOLUTION INTRAVENOUS at 10:54

## 2021-08-24 RX ADMIN — OXYCODONE HYDROCHLORIDE AND ACETAMINOPHEN 1 TABLET: 7.5; 325 TABLET ORAL at 08:41

## 2021-08-24 RX ADMIN — OXYCODONE HYDROCHLORIDE AND ACETAMINOPHEN 1 TABLET: 7.5; 325 TABLET ORAL at 14:09

## 2021-08-24 RX ADMIN — OXYCODONE HYDROCHLORIDE AND ACETAMINOPHEN 2 TABLET: 7.5; 325 TABLET ORAL at 22:50

## 2021-08-24 RX ADMIN — Medication: at 14:09

## 2021-08-24 RX ADMIN — SODIUM CHLORIDE, PRESERVATIVE FREE 10 ML: 5 INJECTION INTRAVENOUS at 21:17

## 2021-08-24 RX ADMIN — OXYCODONE HYDROCHLORIDE AND ACETAMINOPHEN 2 TABLET: 7.5; 325 TABLET ORAL at 18:51

## 2021-08-24 NOTE — ANESTHESIA POSTPROCEDURE EVALUATION
Patient: Socrates Owen    Procedure Summary     Date: 08/23/21 Room / Location: [facility] OR 03 / [facility] MAIN OR    Anesthesia Start: 1846 Anesthesia Stop:     Procedure: FEMORAL NECK OPEN REDUCTION INTERNAL FIXATION (Right Hip) Diagnosis:       Closed fracture of right hip, initial encounter (CMS/[facility])      (Closed fracture of right hip, initial encounter (CMS/[facility]) [S72.001A])    Surgeons: Peterson Michelle MD Provider: Goldberg, Michael, MD    Anesthesia Type: general ASA Status: 1 - Emergent          Anesthesia Type: general    Vitals  Vitals Value Taken Time   /91 08/23/21 2140   Temp 36.3 °C (97.3 °F) 08/23/21 2140   Pulse 75 08/23/21 2144   Resp 12 08/23/21 2140   SpO2 90 % 08/23/21 2144   Vitals shown include unvalidated device data.        Post Anesthesia Care and Evaluation    Patient location during evaluation: bedside  Patient participation: complete - patient participated  Level of consciousness: awake  Pain score: 0  Pain management: adequate  Airway patency: patent  Anesthetic complications: No anesthetic complications  PONV Status: none  Cardiovascular status: acceptable and stable  Respiratory status: acceptable and room air  Hydration status: acceptable    Comments: An Anesthesiologist personally participated in the most demanding procedures (including induction and emergence if applicable) in the anesthesia plan, monitored the course of anesthesia administration at frequent intervals and remained physically present and available for immediate diagnosis and treatment of emergencies.

## 2021-08-25 LAB
HCT VFR BLD AUTO: 35.1 % (ref 37.5–51)
HGB BLD-MCNC: 11.5 G/DL (ref 13–17.7)

## 2021-08-25 PROCEDURE — 97116 GAIT TRAINING THERAPY: CPT

## 2021-08-25 PROCEDURE — 99231 SBSQ HOSP IP/OBS SF/LOW 25: CPT | Performed by: INTERNAL MEDICINE

## 2021-08-25 PROCEDURE — 97535 SELF CARE MNGMENT TRAINING: CPT

## 2021-08-25 PROCEDURE — 85014 HEMATOCRIT: CPT | Performed by: ORTHOPAEDIC SURGERY

## 2021-08-25 PROCEDURE — 97110 THERAPEUTIC EXERCISES: CPT

## 2021-08-25 PROCEDURE — 97165 OT EVAL LOW COMPLEX 30 MIN: CPT

## 2021-08-25 PROCEDURE — 25010000002 ENOXAPARIN PER 10 MG: Performed by: ORTHOPAEDIC SURGERY

## 2021-08-25 PROCEDURE — 85018 HEMOGLOBIN: CPT | Performed by: ORTHOPAEDIC SURGERY

## 2021-08-25 RX ORDER — BISACODYL 5 MG/1
5 TABLET, DELAYED RELEASE ORAL DAILY PRN
Status: DISCONTINUED | OUTPATIENT
Start: 2021-08-25 | End: 2021-08-25

## 2021-08-25 RX ORDER — POLYETHYLENE GLYCOL 3350 17 G/17G
17 POWDER, FOR SOLUTION ORAL DAILY PRN
Status: DISCONTINUED | OUTPATIENT
Start: 2021-08-25 | End: 2021-08-26 | Stop reason: HOSPADM

## 2021-08-25 RX ORDER — BISACODYL 10 MG
10 SUPPOSITORY, RECTAL RECTAL DAILY PRN
Status: DISCONTINUED | OUTPATIENT
Start: 2021-08-25 | End: 2021-08-25

## 2021-08-25 RX ORDER — AMOXICILLIN 250 MG
2 CAPSULE ORAL 2 TIMES DAILY
Status: DISCONTINUED | OUTPATIENT
Start: 2021-08-25 | End: 2021-08-26 | Stop reason: HOSPADM

## 2021-08-25 RX ADMIN — ENOXAPARIN SODIUM 40 MG: 40 INJECTION SUBCUTANEOUS at 09:00

## 2021-08-25 RX ADMIN — DOCUSATE SODIUM 50MG AND SENNOSIDES 8.6MG 2 TABLET: 8.6; 5 TABLET, FILM COATED ORAL at 21:54

## 2021-08-25 RX ADMIN — OXYCODONE HYDROCHLORIDE AND ACETAMINOPHEN 2 TABLET: 7.5; 325 TABLET ORAL at 21:54

## 2021-08-25 RX ADMIN — OXYCODONE HYDROCHLORIDE AND ACETAMINOPHEN 2 TABLET: 7.5; 325 TABLET ORAL at 10:08

## 2021-08-25 RX ADMIN — OXYCODONE HYDROCHLORIDE AND ACETAMINOPHEN 2 TABLET: 7.5; 325 TABLET ORAL at 14:14

## 2021-08-25 RX ADMIN — OXYCODONE HYDROCHLORIDE AND ACETAMINOPHEN 2 TABLET: 7.5; 325 TABLET ORAL at 06:10

## 2021-08-25 RX ADMIN — DOCUSATE SODIUM 50MG AND SENNOSIDES 8.6MG 2 TABLET: 8.6; 5 TABLET, FILM COATED ORAL at 10:08

## 2021-08-25 RX ADMIN — SODIUM CHLORIDE, PRESERVATIVE FREE 3 ML: 5 INJECTION INTRAVENOUS at 21:55

## 2021-08-25 RX ADMIN — Medication: at 21:55

## 2021-08-25 RX ADMIN — Medication: at 06:10

## 2021-08-25 RX ADMIN — OXYCODONE HYDROCHLORIDE AND ACETAMINOPHEN 2 TABLET: 7.5; 325 TABLET ORAL at 18:09

## 2021-08-25 RX ADMIN — Medication: at 14:14

## 2021-08-26 VITALS
TEMPERATURE: 99 F | BODY MASS INDEX: 34.31 KG/M2 | OXYGEN SATURATION: 100 % | HEIGHT: 70 IN | HEART RATE: 96 BPM | DIASTOLIC BLOOD PRESSURE: 97 MMHG | SYSTOLIC BLOOD PRESSURE: 185 MMHG | RESPIRATION RATE: 20 BRPM | WEIGHT: 239.64 LBS

## 2021-08-26 PROCEDURE — 99239 HOSP IP/OBS DSCHRG MGMT >30: CPT | Performed by: INTERNAL MEDICINE

## 2021-08-26 PROCEDURE — 97530 THERAPEUTIC ACTIVITIES: CPT

## 2021-08-26 PROCEDURE — 97116 GAIT TRAINING THERAPY: CPT

## 2021-08-26 PROCEDURE — 25010000002 ENOXAPARIN PER 10 MG: Performed by: ORTHOPAEDIC SURGERY

## 2021-08-26 PROCEDURE — 97110 THERAPEUTIC EXERCISES: CPT

## 2021-08-26 RX ORDER — OXYCODONE AND ACETAMINOPHEN 7.5; 325 MG/1; MG/1
1 TABLET ORAL EVERY 4 HOURS PRN
Qty: 30 TABLET | Refills: 0 | Status: SHIPPED | OUTPATIENT
Start: 2021-08-26 | End: 2021-09-01 | Stop reason: SDUPTHER

## 2021-08-26 RX ADMIN — SODIUM CHLORIDE, PRESERVATIVE FREE 10 ML: 5 INJECTION INTRAVENOUS at 08:25

## 2021-08-26 RX ADMIN — OXYCODONE HYDROCHLORIDE AND ACETAMINOPHEN 2 TABLET: 7.5; 325 TABLET ORAL at 07:08

## 2021-08-26 RX ADMIN — SODIUM CHLORIDE, PRESERVATIVE FREE 3 ML: 5 INJECTION INTRAVENOUS at 08:25

## 2021-08-26 RX ADMIN — OXYCODONE HYDROCHLORIDE AND ACETAMINOPHEN 2 TABLET: 7.5; 325 TABLET ORAL at 11:27

## 2021-08-26 RX ADMIN — ENOXAPARIN SODIUM 40 MG: 40 INJECTION SUBCUTANEOUS at 08:24

## 2021-08-26 RX ADMIN — DOCUSATE SODIUM 50MG AND SENNOSIDES 8.6MG 2 TABLET: 8.6; 5 TABLET, FILM COATED ORAL at 08:24

## 2021-08-26 RX ADMIN — Medication: at 14:15

## 2021-08-26 RX ADMIN — Medication: at 08:26

## 2021-08-27 ENCOUNTER — READMISSION MANAGEMENT (OUTPATIENT)
Dept: CALL CENTER | Facility: HOSPITAL | Age: 46
End: 2021-08-27

## 2021-08-27 NOTE — OUTREACH NOTE
Prep Survey      Responses   [facility] patient discharged from?  [facility]   Is LACE score < 7 ?  No   Emergency Room discharge w/ pulse ox?  No   Eligibility  Readm Mgmt   Discharge diagnosis   FEMORAL NECK OPEN REDUCTION INTERNAL FIXATION    Does the patient have one of the following disease processes/diagnoses(primary or secondary)?  General Surgery   Does the patient have Home health ordered?  No   Is there a DME ordered?  Yes   What DME was ordered?  walker   Prep survey completed?  Yes          Cuca Miranda RN

## 2021-08-31 ENCOUNTER — READMISSION MANAGEMENT (OUTPATIENT)
Dept: CALL CENTER | Facility: HOSPITAL | Age: 46
End: 2021-08-31

## 2021-08-31 NOTE — OUTREACH NOTE
General Surgery Week 1 Survey      Responses   [facility] patient discharged from?  [facility]   Does the patient have one of the following disease processes/diagnoses(primary or secondary)?  General Surgery   Week 1 attempt successful?  No   Unsuccessful attempts  Attempt 1          Mona Ornelas RN

## 2021-09-01 DIAGNOSIS — S72.091A CLOSED COMMINUTED FRACTURE OF RIGHT HIP, INITIAL ENCOUNTER (HCC): ICD-10-CM

## 2021-09-01 RX ORDER — OXYCODONE AND ACETAMINOPHEN 7.5; 325 MG/1; MG/1
1 TABLET ORAL EVERY 4 HOURS PRN
Qty: 30 TABLET | Refills: 0 | Status: SHIPPED | OUTPATIENT
Start: 2021-09-01 | End: 2021-09-07

## 2021-09-01 NOTE — TELEPHONE ENCOUNTER
PT STATED HE HAD SX 8-23 AND HE IS REQUESTING A REFILL ON THE PAIN MED HE WAS GIVEN. PHARMACY HE USES IS THE [facility]. [phone].

## 2021-09-02 ENCOUNTER — READMISSION MANAGEMENT (OUTPATIENT)
Dept: CALL CENTER | Facility: HOSPITAL | Age: 46
End: 2021-09-02

## 2021-09-02 NOTE — OUTREACH NOTE
General Surgery Week 1 Survey      Responses   [facility] patient discharged from?  [facility]   Does the patient have one of the following disease processes/diagnoses(primary or secondary)?  General Surgery   Week 1 attempt successful?  Yes   Call start time  1057   Call end time  1103   Discharge diagnosis   FEMORAL NECK OPEN REDUCTION INTERNAL FIXATION    Is patient permission given to speak with other caregiver?  No   List who call center can speak with  Patient only   Meds reviewed with patient/caregiver?  No   Is the patient having any side effects they believe may be caused by any medication additions or changes?  No   Does the patient have all medications related to this admission filled (includes all antibiotics, pain medications, etc.)  Yes   Is the patient taking all medications as directed (includes completed medication regime)?  Yes   Does the patient have a follow up appointment scheduled with their surgeon?  Yes   Has the patient kept scheduled appointments due by today?  N/A   Comments  09/07/2021 Surgeon   Has home health visited the patient within 72 hours of discharge?  N/A   What DME was ordered?  walker   Has all DME been delivered?  Yes   Psychosocial issues?  No   Comments  Started outpatient PT today   Did the patient receive a copy of their discharge instructions?  Yes   Nursing interventions  Reviewed instructions with patient   What is the patient's perception of their health status since discharge?  Improving   Nursing interventions  Nurse provided patient education   Is the patient /caregiver able to teach back basic post-op care?  Drive as instructed by MD in discharge instructions, No tub bath, swimming, or hot tub until instructed by MD, Take showers only when approved by MD-sponge bathe until then, Keep incision areas clean,dry and protected, Lifting as instructed by MD in discharge instructions   Is the patient/caregiver able to teach back signs and symptoms of incisional  infection?  Fever, Pus or odor from incision, Increased redness, swelling or pain at the incisonal site, Increased drainage or bleeding, Incisional warmth   Is the patient/caregiver able to teach back steps to recovery at home?  Eat a well-balance diet   Is the patient/caregiver able to teach back the hierarchy of who to call/visit for symptoms/problems? PCP, Specialist, Home health nurse, Urgent Care, ED, 911  Yes   Week 1 call completed?  Yes          Mariaa Maurice RN

## 2021-09-07 ENCOUNTER — OFFICE VISIT (OUTPATIENT)
Dept: ORTHOPEDIC SURGERY | Facility: CLINIC | Age: 46
End: 2021-09-07

## 2021-09-07 VITALS — BODY MASS INDEX: 32.16 KG/M2 | OXYGEN SATURATION: 96 % | WEIGHT: 224.6 LBS | HEART RATE: 101 BPM | HEIGHT: 70 IN

## 2021-09-07 DIAGNOSIS — Z87.81 S/P ORIF (OPEN REDUCTION INTERNAL FIXATION) FRACTURE: ICD-10-CM

## 2021-09-07 DIAGNOSIS — M25.551 RIGHT HIP PAIN: Primary | ICD-10-CM

## 2021-09-07 DIAGNOSIS — Z98.890 S/P ORIF (OPEN REDUCTION INTERNAL FIXATION) FRACTURE: ICD-10-CM

## 2021-09-07 PROCEDURE — 99024 POSTOP FOLLOW-UP VISIT: CPT | Performed by: ORTHOPAEDIC SURGERY

## 2021-09-07 RX ORDER — OXYCODONE AND ACETAMINOPHEN 7.5; 325 MG/1; MG/1
TABLET ORAL
Qty: 30 TABLET | Refills: 0 | Status: SHIPPED | OUTPATIENT
Start: 2021-09-07 | End: 2021-09-14 | Stop reason: SDUPTHER

## 2021-09-07 NOTE — PROGRESS NOTES
Chief Complaint  Pain of the Right Hip     Subjective      Socrates Owen presents to [facility] for follow up evaluation of the right hip. The patient is S/P Right Femoral Neck Open Reduction Internal Fixation 8/23/2021. His staples were removed today. He is ambulating on a walker. He is still having bilateral knee pain that is worse with activity. He reports pain with sleeping if his knees are touching. He has been minimal weight bearing on his right hip with a walker. His x-rays of his knee were negative for a fracture.     Allergies   Allergen Reactions   • Keflex [Cephalexin] Hives        Social History     Socioeconomic History   • Marital status:      Spouse name: Not on file   • Number of children: Not on file   • Years of education: Not on file   • Highest education level: Not on file   Tobacco Use   • Smoking status: Never Smoker   • Smokeless tobacco: Never Used   Vaping Use   • Vaping Use: Never used   Substance and Sexual Activity   • Alcohol use: Never   • Drug use: Never   • Sexual activity: Yes     Partners: Female        Review of Systems     Objective   Vital Signs:   There were no vitals taken for this visit.      Physical Exam  Constitutional:       Appearance: Normal appearance. The patient is well-developed and normal weight.   HENT:      Head: Normocephalic.      Right Ear: Hearing and external ear normal.      Left Ear: Hearing and external ear normal.      Nose: Nose normal.   Eyes:      Conjunctiva/sclera: Conjunctivae normal.   Cardiovascular:      Rate and Rhythm: Normal rate.   Pulmonary:      Effort: Pulmonary effort is normal.      Breath sounds: No wheezing or rales.   Abdominal:      Palpations: Abdomen is soft.      Tenderness: There is no abdominal tenderness.   Musculoskeletal:      Cervical back: Normal range of motion.   Skin:     Findings: No rash.   Neurological:      Mental Status: The patient is alert and oriented to person, place, and  time.   Psychiatric:         Mood and Affect: Mood and affect normal.         Judgment: Judgment normal.       Ortho Exam      Right hip- incision well healing. No signs of infection. Hip is stable. Positive EHL, FHL, GS and TA. Sensation intact to all 5 nerves of the foot. Positive pulses. Neurovascularly intact. Resolving bruising over the lateral hip. Mild swelling to the thigh.     Procedures    X-Ray Report:  Right hip(s) X-Ray  Indication: Evaluation of Right hip pain  AP and Lateral view(s)  Findings: midly displaced femoral neck fracture involving the intertrochanteric region extending to the femoral head. Intact gamma nail with no increased displacement.   Prior studies available for comparison: yes           Imaging Results (Most Recent)     None           Result Review :       XR Knee 3 View Left    Result Date: 8/23/2021  Narrative: PROCEDURE: XR KNEE 3 VW LEFT  [facility] 13:44.  INDICATIONS: LEFT KNEE PAIN AND SWELLING  FINDINGS:  BONES: Normal.  No significant arthropathy or acute abnormality.  SOFT TISSUES: Negative.  No visible soft tissue swelling.  EFFUSION: None visible.  OTHER: Negative.   CONCLUSION: No acute disease.       JOSAFAT CORTES MD       Electronically Signed and Approved By: JOSAFAT CORTES MD on 8/23/2021 at 17:03             XR Knee 3 View Right    Result Date: 8/22/2021  Narrative: PROCEDURE: XR KNEE 3 VW RIGHT  [facility] RT, 2/24/2020, 13:43.  INDICATIONS: FELL WHILE PARACHUTING COMPLAINS OF RIGHT KNEE PAIN WITH LIMIT RANGE OF MOTION  FINDINGS:  There is evidence of anterior medial displacement of the right fibula without evidence of acute fracture.  A given slight angulation on the lateral view there is probably maintenance of the patellofemoral compartment.  There may be a small suprapatellar effusion.  CONCLUSION:  1. Evidence of anterior medial displacement of the right fibula  without evidence of acute fracture. 2. Evidence of small suprapatellar effusion.      Alex Esqueda M.D.       Electronically Signed and Approved By: Alex Esqueda M.D. on 8/22/2021 at 16:35             CT Pelvis Without Contrast    Result Date: 8/22/2021  Narrative: PROCEDURE: CT PELVIS WO CONTRAST  COMPARISON: [facility], CR, XR KNEE 3 VW RIGHT, 8/22/2021, 15:56.  INDICATIONS: right hip and pelvic fracture  PROTOCOL:   Standard imaging protocol performed    RADIATION:   DLP: 1405.8 mGy*cm   Automated exposure control was utilized to minimize radiation dose.  TECHNIQUE: After obtaining the patient's consent, CT images were created without intravenous contrast.  Multiplanar imaging was performed.  FINDINGS:  CT pelvis demonstrates is bladder is mildly distended.  Moderate stool throughout the colon.  Appendix.  No adenopathy.  No ascites.  Small fat containing left inguinal hernia.  Several small foci of gas are seen adjacent to the right hip fracture.  There is some hemorrhage seen adjacent to the right hip.  There is a comminuted fracture of the right femoral neck and intertrochanteric region.  Numerous fracture lines are seen.  Fracture also extends into superior right femoral head extending in the joint space.  There is slight overriding and angulation.  There is mild displacement of some of the fractures measuring up to about 2 centimeters.  CONCLUSION:  1. Multiple comminuted displaced fractures of the right femoral neck and trochanter with additional fracture line extending into the base of the right femoral head.  Some of the fragments are displaced by at least 2 centimeters.  There is mild angulation. 2. Several tiny foci of gas are seen adjacent to the fractures.  Correlate for the possibility of open fracture. 3. There is some hemorrhage seen in the right hip.  The     HENRRY DODD MD       Electronically Signed and Approved By: HENRRY DODD MD on 8/22/2021 at 18:46              FL < 1 Hour    Result Date: 8/23/2021  Narrative: This procedure was auto-finalized with no dictation required.    XR Chest 1 View    Result Date: 8/22/2021  Narrative: PROCEDURE: XR CHEST 1 VW  COMPARISON: [facility], , CHEST AP/PA 1 VIEW, 6/02/2017, 4:50.  INDICATIONS: COUGH/PRE-OP.  FINDINGS:A single AP upright portable chest radiograph was performed.  No cardiac enlargement is seen.  No acute infiltrate is appreciated.  No pleural effusion or pneumothorax is identified.  The projection is apical-lordotic, which may accentuate the cardiac silhouette.  External artifacts obscure detail.  Probably no significant interval change is seen since the prior study.  CONCLUSION:No acute infiltrate is appreciated.       JUSTIN NORTON JR, MD       Electronically Signed and Approved By: JUSTIN NORTON JR, MD on 8/22/2021 at 19:26             XR Hip With or Without Pelvis 2 - 3 View Right    Result Date: 8/23/2021  Narrative: PROCEDURE: XR HIP W OR WO PELVIS 2-3 VIEW RIGHT  COMPARISON: [facility], , XR HIP W OR WO PELVIS 2-3 VIEW RIGHT, 8/22/2021, 15:56.  INDICATIONS: right hip fx. fluoro time 2:04 min/27.96 mGy/3 images  FINDINGS:  Three digital intraoperative views of the right hip are provided for review from a procedure performed by Dr. Michelle.  These images document open reduction and internal fixation (ORIF) of a proximal right femoral fracture, utilizing a short gamma nail and a proximal lag screw.  There is a distal lateral-to-medial interlocking screw in place.  The hardware is intact with near-anatomic alignment.  Please see Dr. Michelle's Operative Report for further detail regarding the procedure.  CONCLUSION:  The patient has undergone open reduction and internal fixation (ORIF) of a proximal right femoral fracture, as discussed.     JUSTIN NORTON JR, MD       Electronically Signed and Approved By: JUSTIN NORTON JR, MD on 8/23/2021 at 20:42             XR Hip With or  Without Pelvis 2 - 3 View Right    Result Date: 8/22/2021  Narrative: PROCEDURE: XR HIP W OR WO PELVIS 2-3 VIEW RIGHT  COMPARISON: None  INDICATIONS: FELL WHILE PARACHUTING COMPLAINS OF RIGHT HIP PAIN WITH LIMIT RANGE OF MOTION  FINDINGS:  Acute complex fractures of the proximal right .  Acute fracture of the right of femoral head.  The fracture line extends along the lateral margin of the right femoral neck with complex obliquely oriented fractures through the greater and lesser trochanters of the right femur.  There is some distraction at the fracture sites.  Right femoral acetabular articulation is maintained.  There is a 1.5 cm avulsed fracture fragment adjacent to the acetabular roof.  The AP view of the femur is somewhat angulated.  There is a subtle lucency along the left superior lateral acetabular roof which is suspicious.  Portions of the sacral ala are obscured by bowel/bowel contents.  CONCLUSION:  1. Acute complex multifocal fractures of the proximal right femur involving the junction of the head and neck, greater and lesser trochanters.. 2. Lucency along the superior lateral margin of the left acetabulum.  Hairline fracture cannot be excluded.      Alex Esqueda M.D.       Electronically Signed and Approved By: Alex Esqueda M.D. on 8/22/2021 at 16:41                      Assessment and Plan     DX: S/P Right Femoral Neck Open Reduction Internal Fixation.     Discussed the treatment plan with the patient.  Plan for weight bearing as tolerated with the walker. Plan to continue physical therapy. May consider MRI of bilateral knees in the future if symptoms persist. Refill of pain medication given today.     Call or return if worsening symptoms.    Follow Up     4 weeks with repeat right hip x-rays.       Patient was given instructions and counseling regarding his condition or for health maintenance advice. Please see specific information pulled into the AVS if appropriate.     Scribed for Peterson GERARD  MD Miguel Angel by Dee Zavala.  09/07/21   13:25 EDT  I have personally performed the services described in this document as scribed by the above individual and it is both accurate and complete. Peterson Michelle MD 09/08/21

## 2021-09-13 DIAGNOSIS — Z98.890 S/P ORIF (OPEN REDUCTION INTERNAL FIXATION) FRACTURE: ICD-10-CM

## 2021-09-13 DIAGNOSIS — Z87.81 S/P ORIF (OPEN REDUCTION INTERNAL FIXATION) FRACTURE: ICD-10-CM

## 2021-09-14 RX ORDER — OXYCODONE AND ACETAMINOPHEN 7.5; 325 MG/1; MG/1
TABLET ORAL
Qty: 30 TABLET | Refills: 0 | Status: SHIPPED | OUTPATIENT
Start: 2021-09-14 | End: 2021-09-20 | Stop reason: SDUPTHER

## 2021-09-20 DIAGNOSIS — Z87.81 S/P ORIF (OPEN REDUCTION INTERNAL FIXATION) FRACTURE: ICD-10-CM

## 2021-09-20 DIAGNOSIS — Z98.890 S/P ORIF (OPEN REDUCTION INTERNAL FIXATION) FRACTURE: ICD-10-CM

## 2021-09-20 RX ORDER — OXYCODONE AND ACETAMINOPHEN 7.5; 325 MG/1; MG/1
TABLET ORAL
Qty: 30 TABLET | Refills: 0 | Status: SHIPPED | OUTPATIENT
Start: 2021-09-20 | End: 2021-09-21 | Stop reason: SDUPTHER

## 2021-09-20 NOTE — TELEPHONE ENCOUNTER
Patient requests refill on pain medication. Last fill 09/14/21. S/P R femoral neck ORIF 08/23/21. Amos 24 HR.

## 2021-09-21 DIAGNOSIS — Z98.890 S/P ORIF (OPEN REDUCTION INTERNAL FIXATION) FRACTURE: ICD-10-CM

## 2021-09-21 DIAGNOSIS — Z87.81 S/P ORIF (OPEN REDUCTION INTERNAL FIXATION) FRACTURE: ICD-10-CM

## 2021-09-21 RX ORDER — OXYCODONE AND ACETAMINOPHEN 7.5; 325 MG/1; MG/1
TABLET ORAL
Qty: 30 TABLET | Refills: 0 | Status: ON HOLD | OUTPATIENT
Start: 2021-09-21 | End: 2022-05-11

## 2021-09-21 NOTE — TELEPHONE ENCOUNTER
Patient called stating that [facility] is out of his pain medicine and would like it sent to [facility].

## 2021-09-29 DIAGNOSIS — S72.141D CLOSED 2-PART INTERTROCHANTERIC FRACTURE OF PROXIMAL END OF FEMUR WITH ROUTINE HEALING, RIGHT: Primary | ICD-10-CM

## 2021-09-29 RX ORDER — HYDROCODONE BITARTRATE AND ACETAMINOPHEN 7.5; 325 MG/1; MG/1
1 TABLET ORAL EVERY 4 HOURS PRN
Qty: 36 TABLET | Refills: 0 | Status: SHIPPED | OUTPATIENT
Start: 2021-09-29 | End: 2021-10-05 | Stop reason: SDUPTHER

## 2021-09-29 NOTE — TELEPHONE ENCOUNTER
PT IS REQ A REFILL ON HIS PAIN MED. HE WASN'T SURE WHAT HE WAS GIVEN LAST TIME, HE USES [facility].

## 2021-09-29 NOTE — TELEPHONE ENCOUNTER
Spoke with patient regarding pain medicine. He is almost 5 weeks out from surgery. Informed patient that we would probably change his pain medicine to Norco now. He understood. Send to [facility]. His phone number is [phone]

## 2021-10-05 DIAGNOSIS — S72.141D CLOSED 2-PART INTERTROCHANTERIC FRACTURE OF PROXIMAL END OF FEMUR WITH ROUTINE HEALING, RIGHT: ICD-10-CM

## 2021-10-05 RX ORDER — HYDROCODONE BITARTRATE AND ACETAMINOPHEN 7.5; 325 MG/1; MG/1
1 TABLET ORAL EVERY 6 HOURS PRN
Qty: 30 TABLET | Refills: 0 | Status: SHIPPED | OUTPATIENT
Start: 2021-10-05 | End: 2021-10-12

## 2021-10-05 NOTE — TELEPHONE ENCOUNTER
PT STATED HE DOES WEEKLY REFILLS FOR HIS MEDS AND IS REQ ANOTHER REFILL. HE USES [facility].

## 2021-10-12 ENCOUNTER — OFFICE VISIT (OUTPATIENT)
Dept: ORTHOPEDIC SURGERY | Facility: CLINIC | Age: 46
End: 2021-10-12

## 2021-10-12 VITALS — BODY MASS INDEX: 30.86 KG/M2 | HEIGHT: 70 IN | HEART RATE: 99 BPM | OXYGEN SATURATION: 98 % | WEIGHT: 215.6 LBS

## 2021-10-12 DIAGNOSIS — M25.551 RIGHT HIP PAIN: Primary | ICD-10-CM

## 2021-10-12 DIAGNOSIS — Z87.81 S/P ORIF (OPEN REDUCTION INTERNAL FIXATION) FRACTURE: ICD-10-CM

## 2021-10-12 DIAGNOSIS — Z98.890 S/P ORIF (OPEN REDUCTION INTERNAL FIXATION) FRACTURE: ICD-10-CM

## 2021-10-12 PROCEDURE — 99024 POSTOP FOLLOW-UP VISIT: CPT | Performed by: ORTHOPAEDIC SURGERY

## 2021-10-12 RX ORDER — HYDROCODONE BITARTRATE AND ACETAMINOPHEN 7.5; 325 MG/1; MG/1
1 TABLET ORAL EVERY 6 HOURS PRN
Qty: 30 TABLET | Refills: 0 | Status: SHIPPED | OUTPATIENT
Start: 2021-10-12 | End: 2021-10-22 | Stop reason: SDUPTHER

## 2021-10-12 NOTE — PROGRESS NOTES
"Chief Complaint  Pain of the Right Hip     Subjective      Socrates Owen presents to [facility] for follow up evaluation of the right hip. The patient is S/P Right Femoral Neck Open Reduction Internal Fixation 8/23/2021. The patient states he is still having pain to the hip. He also states that he has pain in his right knee. He states the left knee is better today. He has been doing home exercises. He has only been to one visit of physical therapy since he is a self pay patient. He can not go to therapy and has been trying to do home exercises a couple times a week. He states he has pain in his groin area and also in his right knee. He locates pain to the anterior knee as his source of pain. He denies redness, swelling, fever or chills. He had been using a walker but no longer has one. He has been walking without a walker for about 2 weeks. He denies a new injury.     Allergies   Allergen Reactions   • Keflex [Cephalexin] Hives        Social History     Socioeconomic History   • Marital status:    Tobacco Use   • Smoking status: Never Smoker   • Smokeless tobacco: Never Used   Vaping Use   • Vaping Use: Never used   Substance and Sexual Activity   • Alcohol use: Never   • Drug use: Never   • Sexual activity: Yes     Partners: Female        Review of Systems     Objective   Vital Signs:   Pulse 99   Ht 177.8 cm (70\")   Wt 97.8 kg (215 lb 9.6 oz)   SpO2 98%   BMI 30.94 kg/m²       Physical Exam  Constitutional:       Appearance: Normal appearance. The patient is well-developed and normal weight.   HENT:      Head: Normocephalic.      Right Ear: Hearing and external ear normal.      Left Ear: Hearing and external ear normal.      Nose: Nose normal.   Eyes:      Conjunctiva/sclera: Conjunctivae normal.   Cardiovascular:      Rate and Rhythm: Normal rate.   Pulmonary:      Effort: Pulmonary effort is normal.      Breath sounds: No wheezing or rales.   Abdominal:      " Palpations: Abdomen is soft.      Tenderness: There is no abdominal tenderness.   Musculoskeletal:      Cervical back: Normal range of motion.   Skin:     Findings: No rash.   Neurological:      Mental Status: The patient is alert and oriented to person, place, and time.   Psychiatric:         Mood and Affect: Mood and affect normal.         Judgment: Judgment normal.       Ortho Exam      Right hip- incision well healing. Right leg is mildly shorter than the left. Ambulates with non-antalgic gait. Hip ROM appropriate. Neurovascularly intact. No signs of infection.      Procedures    X-Ray Report:  Right hip(s) X-Ray  Indication: Evaluation of right hip pain  AP and Lateral view(s)  Findings: good healing of femoral neck fracture. No signs of AVN. Intact alban and screws. No signs of hardware failure.   Prior studies available for comparison: yes         Imaging Results (Most Recent)     Procedure Component Value Units Date/Time    XR Hip With or Without Pelvis 2 - 3 View Right [[id number]] Resulted: 10/12/21 1337     Updated: 10/12/21 1338           Result Review :       No results found.           Assessment and Plan     DX: S/P Right Femoral Neck Open Reduction Internal Fixation    Discussed the treatment plan with the patient.  The patient was advised to continue home exercises. He was given a prescription for a cane. He was also advised to use a shoe insert. Plan for weightbearing as tolerated and activity as tolerated. Pain medication refill given today.     Call or return if worsening symptoms.    Follow Up     6 weeks with repeat x-rays      Patient was given instructions and counseling regarding his condition or for health maintenance advice. Please see specific information pulled into the AVS if appropriate.     TranScribed for Peterson Michelle MD by Dee Zavala.  10/12/21   14:18 EDT    I have personally performed the services described in this document as scribed by the above individual and it  is both accurate and complete. Peterson Michelle MD 10/13/21

## 2021-10-21 ENCOUNTER — TELEPHONE (OUTPATIENT)
Dept: ORTHOPEDIC SURGERY | Facility: CLINIC | Age: 46
End: 2021-10-21

## 2021-10-21 NOTE — TELEPHONE ENCOUNTER
PATIENT CALLED AND REQUESTING A REFILL OF PAIN MEDS.  PATIENT WILL BE GOING OUR OF TOWN TOMORROW.  [facility].

## 2021-10-22 ENCOUNTER — TELEPHONE (OUTPATIENT)
Dept: ORTHOPEDIC SURGERY | Facility: CLINIC | Age: 46
End: 2021-10-22

## 2021-10-22 DIAGNOSIS — M25.551 RIGHT HIP PAIN: ICD-10-CM

## 2021-10-22 RX ORDER — HYDROCODONE BITARTRATE AND ACETAMINOPHEN 7.5; 325 MG/1; MG/1
1 TABLET ORAL EVERY 6 HOURS PRN
Qty: 30 TABLET | Refills: 0 | Status: SHIPPED | OUTPATIENT
Start: 2021-10-22 | End: 2021-12-07

## 2021-10-22 NOTE — TELEPHONE ENCOUNTER
PATIENT CALLINGA REQUESTING A REFILL OF PAIN MEDS. GOING OUT OF TOWN TODAY. [facility]

## 2021-12-07 ENCOUNTER — OFFICE VISIT (OUTPATIENT)
Dept: ORTHOPEDIC SURGERY | Facility: CLINIC | Age: 46
End: 2021-12-07

## 2021-12-07 VITALS — HEIGHT: 70 IN | BODY MASS INDEX: 30.78 KG/M2 | WEIGHT: 215 LBS

## 2021-12-07 DIAGNOSIS — M25.551 RIGHT HIP PAIN: Primary | ICD-10-CM

## 2021-12-07 PROCEDURE — 99213 OFFICE O/P EST LOW 20 MIN: CPT | Performed by: ORTHOPAEDIC SURGERY

## 2021-12-07 RX ORDER — HYDROCODONE BITARTRATE AND ACETAMINOPHEN 7.5; 325 MG/1; MG/1
1 TABLET ORAL EVERY 4 HOURS PRN
Qty: 18 TABLET | Refills: 0 | Status: ON HOLD | OUTPATIENT
Start: 2021-12-07 | End: 2022-05-11

## 2021-12-07 NOTE — PROGRESS NOTES
"Chief Complaint  Pain of the Right Hip     Subjective      Socrates Owen Jr. presents to [facility] for follow up evaluation of the right hip. The patient is still having constant hip pain. He takes ibuprofen and tylenol for the pain without relief. The patient is S/P Right Femoral Neck Open Reduction Internal Fixation 8/23/2021. He has no new injury or trauma.     Allergies   Allergen Reactions   • Cephalexin Hives and Unknown - Low Severity        Social History     Socioeconomic History   • Marital status:    Tobacco Use   • Smoking status: Never Smoker   • Smokeless tobacco: Never Used   Vaping Use   • Vaping Use: Never used   Substance and Sexual Activity   • Alcohol use: Never   • Drug use: Never   • Sexual activity: Yes     Partners: Female        Review of Systems     Objective   Vital Signs:   Ht 177.8 cm (70\")   Wt 97.5 kg (215 lb)   BMI 30.85 kg/m²       Physical Exam  Constitutional:       Appearance: Normal appearance. The patient is well-developed and normal weight.   HENT:      Head: Normocephalic.      Right Ear: Hearing and external ear normal.      Left Ear: Hearing and external ear normal.      Nose: Nose normal.   Eyes:      Conjunctiva/sclera: Conjunctivae normal.   Cardiovascular:      Rate and Rhythm: Normal rate.   Pulmonary:      Effort: Pulmonary effort is normal.      Breath sounds: No wheezing or rales.   Abdominal:      Palpations: Abdomen is soft.      Tenderness: There is no abdominal tenderness.   Musculoskeletal:      Cervical back: Normal range of motion.   Skin:     Findings: No rash.   Neurological:      Mental Status: The patient is alert and oriented to person, place, and time.   Psychiatric:         Mood and Affect: Mood and affect normal.         Judgment: Judgment normal.       Ortho Exam      Right hip- flexion 90. External Rotation 15. Internal rotation 15. Neurovascularly intact. Antalgic gait. Neurovascularly intact. Positive EHL, " FHL, GS and TA. Sensation intact to all 5 nerves of the foot. Positive pulses. Neurovascularly intact. Well healed incision sites.     Procedures    X-Ray Report:  Right hip(s) X-Ray  Indication: Evaluation of right hip pain  AP and Lateral view(s)  Findings: delayed healing of femoral neck fracture, fracture line still visable. No signs of AVN. Intact alban and screws. No signs of hardware failure.   Prior studies available for comparison: yes     Imaging Results (Most Recent)     Procedure Component Value Units Date/Time    XR Hip With or Without Pelvis 2 - 3 View Right [[id number]] Resulted: 12/07/21 0842     Updated: 12/07/21 0843           Result Review :       No results found.           Assessment and Plan     DX: S/P Right Femoral Neck Open Reduction Internal Fixation    Discussed the treatment plan with the patient.  Order for CT Scan given today to evaluate delayed healing. Plan for weight bearing as tolerated.     Call or return if worsening symptoms.    Follow Up     After CT Scan      Patient was given instructions and counseling regarding his condition or for health maintenance advice. Please see specific information pulled into the AVS if appropriate.     Scribed for Peterson Michelle MD by Dee Zavala.  12/07/21   08:52 EST    I have personally performed the services described in this document as scribed by the above individual and it is both accurate and complete. Peterson Michelle MD 12/07/21

## 2021-12-10 ENCOUNTER — TELEPHONE (OUTPATIENT)
Dept: ORTHOPEDIC SURGERY | Facility: CLINIC | Age: 46
End: 2021-12-10

## 2021-12-22 ENCOUNTER — APPOINTMENT (OUTPATIENT)
Dept: CT IMAGING | Facility: HOSPITAL | Age: 46
End: 2021-12-22

## 2022-03-24 ENCOUNTER — TELEPHONE (OUTPATIENT)
Dept: ORTHOPEDIC SURGERY | Facility: CLINIC | Age: 47
End: 2022-03-24

## 2022-03-24 DIAGNOSIS — Z87.81 S/P ORIF (OPEN REDUCTION INTERNAL FIXATION) FRACTURE: Primary | ICD-10-CM

## 2022-03-24 DIAGNOSIS — M25.551 RIGHT HIP PAIN: ICD-10-CM

## 2022-03-24 DIAGNOSIS — Z98.890 S/P ORIF (OPEN REDUCTION INTERNAL FIXATION) FRACTURE: Primary | ICD-10-CM

## 2022-03-24 NOTE — TELEPHONE ENCOUNTER
Caller: PATIENT       Relationship to patient: SELF       Best call back number: [phone]     Chief complaint: RIGHT HIP PAIN     Type of visit: CT SCAN OF RIGHT HIP     Requested date: ASAP       Additional notes: PT. OF DR. CAMILO- LAST SAW HIM IN December 2021 FOR RIGHT HIP.   PT. STATES THAT DR. CAMIOL WANTED HIM TO HAVE A CT SCAN OF RIGHT HIP- BUT PT. COULD NOT DO IT AT THAT TIME BECAUSE HE DID NOT HAVE INSURANCE.   HE JUST SIGNED UP TODAY FOR [employer] [place]- BUT THEY DID NOT YET GIVE HIM AN ID NUMBER.   PT. ASKING IF HE CAN GET THE PROCESS STARTED FOR HAVING HIS CT SCAN SCHEDULED.  PLEASE CALL TO [facility].

## 2022-03-25 NOTE — TELEPHONE ENCOUNTER
Will place a new order for CT of the right hip per Dr. Michelle. Called and left message for patient regarding this.

## 2022-04-20 ENCOUNTER — HOSPITAL ENCOUNTER (OUTPATIENT)
Dept: CT IMAGING | Facility: HOSPITAL | Age: 47
Discharge: HOME | End: 2022-04-20
Admitting: ORTHOPAEDIC SURGERY

## 2022-04-20 ENCOUNTER — TELEPHONE (OUTPATIENT)
Dept: ORTHOPEDIC SURGERY | Facility: CLINIC | Age: 47
End: 2022-04-20

## 2022-04-20 DIAGNOSIS — Z98.890 S/P ORIF (OPEN REDUCTION INTERNAL FIXATION) FRACTURE: ICD-10-CM

## 2022-04-20 DIAGNOSIS — Z87.81 S/P ORIF (OPEN REDUCTION INTERNAL FIXATION) FRACTURE: ICD-10-CM

## 2022-04-20 DIAGNOSIS — M25.551 RIGHT HIP PAIN: ICD-10-CM

## 2022-04-20 PROCEDURE — 72192 CT PELVIS W/O DYE: CPT

## 2022-04-20 NOTE — TELEPHONE ENCOUNTER
Caller: PATIENT     Relationship to patient: SELF     Best call back number: [phone]    Chief complaint:     Type of visit: FOLLOW UP CT SCAN RIGHT HIP DONE 04/20/22    Requested date: THIS WEEK       Additional notes: PT. CALLED TO SCHEDULE FOLLOW UP OF CT SCAN OF RIGHT HIP DONE TODAY.   THERE ARE NO OPENINGS UNITL 04/26/22.   PT. ASKING IF DR. CAMILO CAN WORK HIM IN THIS WEEK.   PLEASE CALL TO [facility]VISE.

## 2022-04-22 ENCOUNTER — OFFICE VISIT (OUTPATIENT)
Dept: ORTHOPEDIC SURGERY | Facility: CLINIC | Age: 47
End: 2022-04-22

## 2022-04-22 ENCOUNTER — PREP FOR SURGERY (OUTPATIENT)
Dept: OTHER | Facility: HOSPITAL | Age: 47
End: 2022-04-22

## 2022-04-22 VITALS — BODY MASS INDEX: 32.21 KG/M2 | WEIGHT: 225 LBS | HEIGHT: 70 IN

## 2022-04-22 DIAGNOSIS — Z98.890 S/P ORIF (OPEN REDUCTION INTERNAL FIXATION) FRACTURE: ICD-10-CM

## 2022-04-22 DIAGNOSIS — S72.091K: Primary | ICD-10-CM

## 2022-04-22 DIAGNOSIS — Z87.81 S/P ORIF (OPEN REDUCTION INTERNAL FIXATION) FRACTURE: ICD-10-CM

## 2022-04-22 DIAGNOSIS — Z47.1 AFTERCARE FOLLOWING RIGHT HIP JOINT REPLACEMENT SURGERY: Primary | ICD-10-CM

## 2022-04-22 DIAGNOSIS — Z96.641 AFTERCARE FOLLOWING RIGHT HIP JOINT REPLACEMENT SURGERY: Primary | ICD-10-CM

## 2022-04-22 PROBLEM — M16.11 ARTHRITIS OF RIGHT HIP: Status: ACTIVE | Noted: 2022-04-22

## 2022-04-22 PROBLEM — S72.099K: Status: ACTIVE | Noted: 2022-04-22

## 2022-04-22 PROCEDURE — 99214 OFFICE O/P EST MOD 30 MIN: CPT | Performed by: ORTHOPAEDIC SURGERY

## 2022-04-22 RX ORDER — CLINDAMYCIN PHOSPHATE 900 MG/50ML
900 INJECTION INTRAVENOUS ONCE
Status: CANCELLED | OUTPATIENT
Start: 2022-04-22 | End: 2022-04-22

## 2022-04-22 RX ORDER — METOPROLOL SUCCINATE 25 MG/1
25 TABLET, EXTENDED RELEASE ORAL EVERY MORNING
COMMUNITY
Start: 2022-04-19

## 2022-04-22 RX ORDER — TRANEXAMIC ACID 10 MG/ML
1000 INJECTION, SOLUTION INTRAVENOUS ONCE
Status: CANCELLED | OUTPATIENT
Start: 2022-04-22 | End: 2022-04-22

## 2022-04-22 RX ORDER — TRAMADOL HYDROCHLORIDE 50 MG/1
TABLET ORAL
Status: ON HOLD | COMMUNITY
Start: 2022-04-19 | End: 2022-05-11
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
## 2022-05-03 ENCOUNTER — PRE-ADMISSION TESTING (OUTPATIENT)
Dept: PREADMISSION TESTING | Facility: HOSPITAL | Age: 47
End: 2022-05-03

## 2022-05-03 VITALS
DIASTOLIC BLOOD PRESSURE: 84 MMHG | RESPIRATION RATE: 16 BRPM | SYSTOLIC BLOOD PRESSURE: 134 MMHG | BODY MASS INDEX: 32.63 KG/M2 | HEIGHT: 70 IN | WEIGHT: 227.96 LBS | HEART RATE: 64 BPM | TEMPERATURE: 97.4 F | OXYGEN SATURATION: 98 %

## 2022-05-03 DIAGNOSIS — S72.091K: ICD-10-CM

## 2022-05-03 LAB
ALBUMIN SERPL-MCNC: 4.7 G/DL (ref 3.5–5.2)
ALBUMIN/GLOB SERPL: 1.6 G/DL
ALP SERPL-CCNC: 114 U/L (ref 39–117)
ALT SERPL W P-5'-P-CCNC: 38 U/L (ref 1–41)
ANION GAP SERPL CALCULATED.3IONS-SCNC: 12.9 MMOL/L (ref 5–15)
AST SERPL-CCNC: 25 U/L (ref 1–40)
BASOPHILS # BLD AUTO: 0.14 10*3/MM3 (ref 0–0.2)
BASOPHILS NFR BLD AUTO: 1.4 % (ref 0–1.5)
BILIRUB SERPL-MCNC: 0.2 MG/DL (ref 0–1.2)
BUN SERPL-MCNC: 16 MG/DL (ref 6–20)
BUN/CREAT SERPL: 18 (ref 7–25)
CALCIUM SPEC-SCNC: 9.8 MG/DL (ref 8.6–10.5)
CHLORIDE SERPL-SCNC: 102 MMOL/L (ref 98–107)
CO2 SERPL-SCNC: 21.1 MMOL/L (ref 22–29)
CREAT SERPL-MCNC: 0.89 MG/DL (ref 0.76–1.27)
DEPRECATED RDW RBC AUTO: 36.6 FL (ref 37–54)
EGFRCR SERPLBLD CKD-EPI 2021: 108.4 ML/MIN/1.73
EOSINOPHIL # BLD AUTO: 0.46 10*3/MM3 (ref 0–0.4)
EOSINOPHIL NFR BLD AUTO: 4.7 % (ref 0.3–6.2)
ERYTHROCYTE [DISTWIDTH] IN BLOOD BY AUTOMATED COUNT: 12.6 % (ref 12.3–15.4)
GLOBULIN UR ELPH-MCNC: 3 GM/DL
GLUCOSE SERPL-MCNC: 173 MG/DL (ref 65–99)
HBA1C MFR BLD: 7.9 % (ref 4.8–5.6)
HCT VFR BLD AUTO: 43.1 % (ref 37.5–51)
HGB BLD-MCNC: 14.5 G/DL (ref 13–17.7)
IMM GRANULOCYTES # BLD AUTO: 0.04 10*3/MM3 (ref 0–0.05)
IMM GRANULOCYTES NFR BLD AUTO: 0.4 % (ref 0–0.5)
INR PPP: 1.01 (ref 0.86–1.15)
LYMPHOCYTES # BLD AUTO: 3.09 10*3/MM3 (ref 0.7–3.1)
LYMPHOCYTES NFR BLD AUTO: 31.7 % (ref 19.6–45.3)
MCH RBC QN AUTO: 27.2 PG (ref 26.6–33)
MCHC RBC AUTO-ENTMCNC: 33.6 G/DL (ref 31.5–35.7)
MCV RBC AUTO: 80.9 FL (ref 79–97)
MONOCYTES # BLD AUTO: 0.71 10*3/MM3 (ref 0.1–0.9)
MONOCYTES NFR BLD AUTO: 7.3 % (ref 5–12)
NEUTROPHILS NFR BLD AUTO: 5.31 10*3/MM3 (ref 1.7–7)
NEUTROPHILS NFR BLD AUTO: 54.5 % (ref 42.7–76)
NRBC BLD AUTO-RTO: 0 /100 WBC (ref 0–0.2)
PLATELET # BLD AUTO: 283 10*3/MM3 (ref 140–450)
PMV BLD AUTO: 9.5 FL (ref 6–12)
POTASSIUM SERPL-SCNC: 4.4 MMOL/L (ref 3.5–5.2)
PROT SERPL-MCNC: 7.7 G/DL (ref 6–8.5)
PROTHROMBIN TIME: 13.4 SECONDS (ref 11.8–14.9)
RBC # BLD AUTO: 5.33 10*6/MM3 (ref 4.14–5.8)
SODIUM SERPL-SCNC: 136 MMOL/L (ref 136–145)
WBC NRBC COR # BLD: 9.75 10*3/MM3 (ref 3.4–10.8)

## 2022-05-03 PROCEDURE — 85025 COMPLETE CBC W/AUTO DIFF WBC: CPT

## 2022-05-03 PROCEDURE — 80053 COMPREHEN METABOLIC PANEL: CPT

## 2022-05-03 PROCEDURE — 36415 COLL VENOUS BLD VENIPUNCTURE: CPT

## 2022-05-03 PROCEDURE — 83036 HEMOGLOBIN GLYCOSYLATED A1C: CPT

## 2022-05-03 PROCEDURE — 85610 PROTHROMBIN TIME: CPT

## 2022-05-03 RX ORDER — MELOXICAM 15 MG/1
15 TABLET ORAL DAILY
Status: ON HOLD | COMMUNITY
End: 2022-05-11

## 2022-05-03 ASSESSMENT — HOOS JR
HOOS JR SCORE: 46.652
HOOS JR SCORE: 14

## 2022-05-03 NOTE — SIGNIFICANT NOTE
PT ATTENDED TOTAL JOINT INST. CLASS, RECEIVED EDUCATIONAL MATERIALS AND SOAP. UNDERSTANDING VERBALIZED
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
## 2022-05-03 NOTE — DISCHARGE INSTRUCTIONS
IMPORTANT INSTRUCTIONS - PRE-ADMISSION TESTING  DO NOT EAT OR CHEW anything after midnight the night before your procedure.    You may have CLEAR liquids up to ____3__ hours prior to ARRIVAL time. INCLUDES GATORADE, 20 OZ NO RED, SUGAR FREE  Take the following medications the morning of your procedure with JUST A SIP OF WATER:  _METOPROLOL, TYLENOL AS NEEDED  ______________________________________________________________________________________________________________________________________________________________________________________    DO NOT BRING your medications to the hospital with you, UNLESS something has changed since your PRE-Admission Testing appointment.  Hold all vitamins, supplements, and NSAIDS (Non- steroidal anti-inflammatory meds) for one week prior to surgery (you MAY take Tylenol or Acetaminophen).  HOLD MELOXICAM NOW  If you are diabetic, check your blood sugar the morning of your procedure. If it is less than 70 or if you are feeling symptomatic, call the following number for further instructions: [phone] SAME DAY SURGERY WILL CALL YOUR ARRIVAL TIME 5/10/22 BY 4 P.M.____.  Use your inhalers/nebulizers as usual, the morning of your procedure. BRING YOUR INHALERS with you. NA  Bring your CPAP or BIPAP to hospital, ONLY IF YOU WILL BE SPENDING THE NIGHT. NA  Make sure you have a ride home and have someone who will stay with you the day of your procedure after you go home.  If you have any questions, please call your Pre-Admission Testing Nurse, ______KATLYN__________ at [phone]____________.   Per anesthesia request, do not smoke for 24 hours before your procedure or as instructed by your surgeon.   NA  SHOWER WITH SURGICAL SOAP AS DIRECTED AND SCHEDULED ON PAGE 9 OF TOTAL JOINT BOOK.  BRING TOTAL JOINT  INSTRUCTION BOOK AND WALKER WITH YOU THE DAY OF SURGERY.

## 2022-05-06 ENCOUNTER — ANESTHESIA EVENT (OUTPATIENT)
Dept: PERIOP | Facility: HOSPITAL | Age: 47
End: 2022-05-06

## 2022-05-06 ENCOUNTER — LAB (OUTPATIENT)
Dept: LAB | Facility: HOSPITAL | Age: 47
End: 2022-05-06

## 2022-05-06 DIAGNOSIS — S72.091K: ICD-10-CM

## 2022-05-06 LAB — SARS-COV-2 RNA PNL SPEC NAA+PROBE: NOT DETECTED

## 2022-05-06 PROCEDURE — U0004 COV-19 TEST NON-CDC HGH THRU: HCPCS

## 2022-05-11 ENCOUNTER — APPOINTMENT (OUTPATIENT)
Dept: GENERAL RADIOLOGY | Facility: HOSPITAL | Age: 47
End: 2022-05-11

## 2022-05-11 ENCOUNTER — ANESTHESIA (OUTPATIENT)
Dept: PERIOP | Facility: HOSPITAL | Age: 47
End: 2022-05-11

## 2022-05-11 ENCOUNTER — HOSPITAL ENCOUNTER (OUTPATIENT)
Facility: HOSPITAL | Age: 47
Discharge: HOME | End: 2022-05-12
Attending: ORTHOPAEDIC SURGERY | Admitting: ORTHOPAEDIC SURGERY

## 2022-05-11 DIAGNOSIS — Z78.9 DECREASED ACTIVITIES OF DAILY LIVING (ADL): ICD-10-CM

## 2022-05-11 DIAGNOSIS — R26.2 DIFFICULTY WALKING: Primary | ICD-10-CM

## 2022-05-11 DIAGNOSIS — S72.091K: ICD-10-CM

## 2022-05-11 PROBLEM — R20.0 NUMBNESS OF FINGERS: Status: ACTIVE | Noted: 2022-05-11

## 2022-05-11 LAB
HCT VFR BLD AUTO: 36.9 % (ref 37.5–51)
HGB BLD-MCNC: 12.1 G/DL (ref 13–17.7)

## 2022-05-11 PROCEDURE — 25010000002 ONDANSETRON PER 1 MG: Performed by: NURSE ANESTHETIST, CERTIFIED REGISTERED

## 2022-05-11 PROCEDURE — 25010000002 HYDROMORPHONE 1 MG/ML SOLUTION: Performed by: NURSE ANESTHETIST, CERTIFIED REGISTERED

## 2022-05-11 PROCEDURE — 85014 HEMATOCRIT: CPT | Performed by: ORTHOPAEDIC SURGERY

## 2022-05-11 PROCEDURE — 25010000002 FENTANYL CITRATE (PF) 50 MCG/ML SOLUTION: Performed by: NURSE ANESTHETIST, CERTIFIED REGISTERED

## 2022-05-11 PROCEDURE — 0 MEPERIDINE PER 100 MG: Performed by: NURSE ANESTHETIST, CERTIFIED REGISTERED

## 2022-05-11 PROCEDURE — C1776 JOINT DEVICE (IMPLANTABLE): HCPCS | Performed by: ORTHOPAEDIC SURGERY

## 2022-05-11 PROCEDURE — 25010000002 DEXAMETHASONE PER 1 MG: Performed by: NURSE ANESTHETIST, CERTIFIED REGISTERED

## 2022-05-11 PROCEDURE — 25010000002 MORPHINE SULFATE (PF) 10 MG/ML SOLUTION: Performed by: ORTHOPAEDIC SURGERY

## 2022-05-11 PROCEDURE — 97161 PT EVAL LOW COMPLEX 20 MIN: CPT

## 2022-05-11 PROCEDURE — 25010000002 MIDAZOLAM PER 1 MG: Performed by: ANESTHESIOLOGY

## 2022-05-11 PROCEDURE — 73502 X-RAY EXAM HIP UNI 2-3 VIEWS: CPT

## 2022-05-11 PROCEDURE — 76000 FLUOROSCOPY <1 HR PHYS/QHP: CPT

## 2022-05-11 PROCEDURE — 25010000002 EPINEPHRINE 1 MG/ML SOLUTION: Performed by: ORTHOPAEDIC SURGERY

## 2022-05-11 PROCEDURE — 94761 N-INVAS EAR/PLS OXIMETRY MLT: CPT

## 2022-05-11 PROCEDURE — 0 HYDROMORPHONE 1 MG/ML SOLUTION: Performed by: NURSE ANESTHETIST, CERTIFIED REGISTERED

## 2022-05-11 PROCEDURE — 25010000002 PROPOFOL 10 MG/ML EMULSION: Performed by: NURSE ANESTHETIST, CERTIFIED REGISTERED

## 2022-05-11 PROCEDURE — 27130 TOTAL HIP ARTHROPLASTY: CPT | Performed by: ORTHOPAEDIC SURGERY

## 2022-05-11 PROCEDURE — 25010000002 ROPIVACAINE PER 1 MG: Performed by: ORTHOPAEDIC SURGERY

## 2022-05-11 PROCEDURE — 25010000002 KETOROLAC TROMETHAMINE PER 15 MG: Performed by: ORTHOPAEDIC SURGERY

## 2022-05-11 PROCEDURE — 85018 HEMOGLOBIN: CPT | Performed by: ORTHOPAEDIC SURGERY

## 2022-05-11 PROCEDURE — 94799 UNLISTED PULMONARY SVC/PX: CPT

## 2022-05-11 DEVICE — ADAPT HIP BIOLOX OPTN TYPE1 TPR STD: Type: IMPLANTABLE DEVICE | Site: HIP | Status: FUNCTIONAL

## 2022-05-11 DEVICE — SHLL ACET G7 PPS LTD/HL TI SZE 52MM: Type: IMPLANTABLE DEVICE | Site: HIP | Status: FUNCTIONAL

## 2022-05-11 DEVICE — IMPLANTABLE DEVICE: Type: IMPLANTABLE DEVICE | Site: HIP | Status: FUNCTIONAL

## 2022-05-11 DEVICE — HD FEM/HIP G7 BIOLOX/DELTA OPTN 36MM: Type: IMPLANTABLE DEVICE | Site: HIP | Status: FUNCTIONAL

## 2022-05-11 DEVICE — LINER ACET G7 NTRL E1 SZE 36MM: Type: IMPLANTABLE DEVICE | Site: HIP | Status: FUNCTIONAL

## 2022-05-11 DEVICE — CONE FEM BDY PROX STDOFFST ARCOS A 60: Type: IMPLANTABLE DEVICE | Site: HIP | Status: FUNCTIONAL

## 2022-05-11 RX ORDER — KETOROLAC TROMETHAMINE 15 MG/ML
15 INJECTION, SOLUTION INTRAMUSCULAR; INTRAVENOUS EVERY 6 HOURS
Status: COMPLETED | OUTPATIENT
Start: 2022-05-11 | End: 2022-05-12

## 2022-05-11 RX ORDER — MIDAZOLAM HYDROCHLORIDE 1 MG/ML
2 INJECTION INTRAMUSCULAR; INTRAVENOUS ONCE
Status: COMPLETED | OUTPATIENT
Start: 2022-05-11 | End: 2022-05-11

## 2022-05-11 RX ORDER — OXYCODONE HYDROCHLORIDE 5 MG/1
5 TABLET ORAL
Status: DISCONTINUED | OUTPATIENT
Start: 2022-05-11 | End: 2022-05-11 | Stop reason: HOSPADM

## 2022-05-11 RX ORDER — PROMETHAZINE HYDROCHLORIDE 12.5 MG/1
12.5 SUPPOSITORY RECTAL EVERY 6 HOURS PRN
Status: DISCONTINUED | OUTPATIENT
Start: 2022-05-11 | End: 2022-05-12 | Stop reason: HOSPADM

## 2022-05-11 RX ORDER — SODIUM CHLORIDE 0.9 % (FLUSH) 0.9 %
3 SYRINGE (ML) INJECTION EVERY 12 HOURS SCHEDULED
Status: DISCONTINUED | OUTPATIENT
Start: 2022-05-11 | End: 2022-05-12 | Stop reason: HOSPADM

## 2022-05-11 RX ORDER — PROMETHAZINE HYDROCHLORIDE 12.5 MG/1
25 TABLET ORAL ONCE AS NEEDED
Status: DISCONTINUED | OUTPATIENT
Start: 2022-05-11 | End: 2022-05-11 | Stop reason: HOSPADM

## 2022-05-11 RX ORDER — NALOXONE HCL 0.4 MG/ML
0.4 VIAL (ML) INJECTION
Status: DISCONTINUED | OUTPATIENT
Start: 2022-05-11 | End: 2022-05-12 | Stop reason: HOSPADM

## 2022-05-11 RX ORDER — DEXAMETHASONE SODIUM PHOSPHATE 4 MG/ML
INJECTION, SOLUTION INTRA-ARTICULAR; INTRALESIONAL; INTRAMUSCULAR; INTRAVENOUS; SOFT TISSUE AS NEEDED
Status: DISCONTINUED | OUTPATIENT
Start: 2022-05-11 | End: 2022-05-11 | Stop reason: SURG

## 2022-05-11 RX ORDER — ONDANSETRON 2 MG/ML
4 INJECTION INTRAMUSCULAR; INTRAVENOUS ONCE AS NEEDED
Status: DISCONTINUED | OUTPATIENT
Start: 2022-05-11 | End: 2022-05-11 | Stop reason: HOSPADM

## 2022-05-11 RX ORDER — TRANEXAMIC ACID 10 MG/ML
1000 INJECTION, SOLUTION INTRAVENOUS ONCE
Status: COMPLETED | OUTPATIENT
Start: 2022-05-11 | End: 2022-05-11

## 2022-05-11 RX ORDER — OXYCODONE AND ACETAMINOPHEN 7.5; 325 MG/1; MG/1
2 TABLET ORAL EVERY 4 HOURS PRN
Status: DISCONTINUED | OUTPATIENT
Start: 2022-05-11 | End: 2022-05-12 | Stop reason: HOSPADM

## 2022-05-11 RX ORDER — MEPERIDINE HYDROCHLORIDE 25 MG/ML
12.5 INJECTION INTRAMUSCULAR; INTRAVENOUS; SUBCUTANEOUS
Status: DISCONTINUED | OUTPATIENT
Start: 2022-05-11 | End: 2022-05-11 | Stop reason: HOSPADM

## 2022-05-11 RX ORDER — CELECOXIB 100 MG/1
200 CAPSULE ORAL ONCE
Status: COMPLETED | OUTPATIENT
Start: 2022-05-11 | End: 2022-05-11

## 2022-05-11 RX ORDER — DOCUSATE SODIUM 100 MG/1
100 CAPSULE, LIQUID FILLED ORAL 2 TIMES DAILY PRN
Status: DISCONTINUED | OUTPATIENT
Start: 2022-05-11 | End: 2022-05-12 | Stop reason: HOSPADM

## 2022-05-11 RX ORDER — CLINDAMYCIN PHOSPHATE 900 MG/50ML
900 INJECTION INTRAVENOUS ONCE
Status: COMPLETED | OUTPATIENT
Start: 2022-05-11 | End: 2022-05-11

## 2022-05-11 RX ORDER — PROMETHAZINE HYDROCHLORIDE 12.5 MG/1
12.5 TABLET ORAL EVERY 6 HOURS PRN
Status: DISCONTINUED | OUTPATIENT
Start: 2022-05-11 | End: 2022-05-12 | Stop reason: HOSPADM

## 2022-05-11 RX ORDER — SODIUM CHLORIDE 0.9 % (FLUSH) 0.9 %
10 SYRINGE (ML) INJECTION AS NEEDED
Status: DISCONTINUED | OUTPATIENT
Start: 2022-05-11 | End: 2022-05-11 | Stop reason: HOSPADM

## 2022-05-11 RX ORDER — ONDANSETRON 2 MG/ML
INJECTION INTRAMUSCULAR; INTRAVENOUS AS NEEDED
Status: DISCONTINUED | OUTPATIENT
Start: 2022-05-11 | End: 2022-05-11 | Stop reason: SURG

## 2022-05-11 RX ORDER — ACETAMINOPHEN 500 MG
1000 TABLET ORAL EVERY 8 HOURS
Status: DISCONTINUED | OUTPATIENT
Start: 2022-05-11 | End: 2022-05-12 | Stop reason: HOSPADM

## 2022-05-11 RX ORDER — CLINDAMYCIN PHOSPHATE 900 MG/50ML
900 INJECTION INTRAVENOUS EVERY 8 HOURS
Status: COMPLETED | OUTPATIENT
Start: 2022-05-11 | End: 2022-05-11

## 2022-05-11 RX ORDER — PROPOFOL 10 MG/ML
VIAL (ML) INTRAVENOUS AS NEEDED
Status: DISCONTINUED | OUTPATIENT
Start: 2022-05-11 | End: 2022-05-11 | Stop reason: SURG

## 2022-05-11 RX ORDER — ONDANSETRON 4 MG/1
4 TABLET, FILM COATED ORAL EVERY 6 HOURS PRN
Status: DISCONTINUED | OUTPATIENT
Start: 2022-05-11 | End: 2022-05-12 | Stop reason: HOSPADM

## 2022-05-11 RX ORDER — FENTANYL CITRATE 50 UG/ML
INJECTION, SOLUTION INTRAMUSCULAR; INTRAVENOUS AS NEEDED
Status: DISCONTINUED | OUTPATIENT
Start: 2022-05-11 | End: 2022-05-11 | Stop reason: SURG

## 2022-05-11 RX ORDER — SODIUM CHLORIDE 0.9 % (FLUSH) 0.9 %
10 SYRINGE (ML) INJECTION AS NEEDED
Status: DISCONTINUED | OUTPATIENT
Start: 2022-05-11 | End: 2022-05-12 | Stop reason: HOSPADM

## 2022-05-11 RX ORDER — LIDOCAINE HYDROCHLORIDE 20 MG/ML
INJECTION, SOLUTION EPIDURAL; INFILTRATION; INTRACAUDAL; PERINEURAL AS NEEDED
Status: DISCONTINUED | OUTPATIENT
Start: 2022-05-11 | End: 2022-05-11 | Stop reason: SURG

## 2022-05-11 RX ORDER — PROMETHAZINE HYDROCHLORIDE 25 MG/1
25 SUPPOSITORY RECTAL ONCE AS NEEDED
Status: DISCONTINUED | OUTPATIENT
Start: 2022-05-11 | End: 2022-05-11 | Stop reason: HOSPADM

## 2022-05-11 RX ORDER — OXYCODONE AND ACETAMINOPHEN 7.5; 325 MG/1; MG/1
1 TABLET ORAL EVERY 4 HOURS PRN
Status: DISCONTINUED | OUTPATIENT
Start: 2022-05-11 | End: 2022-05-12 | Stop reason: HOSPADM

## 2022-05-11 RX ORDER — METOPROLOL SUCCINATE 25 MG/1
25 TABLET, EXTENDED RELEASE ORAL EVERY MORNING
Status: DISCONTINUED | OUTPATIENT
Start: 2022-05-12 | End: 2022-05-12 | Stop reason: HOSPADM

## 2022-05-11 RX ORDER — MAGNESIUM HYDROXIDE 1200 MG/15ML
LIQUID ORAL AS NEEDED
Status: DISCONTINUED | OUTPATIENT
Start: 2022-05-11 | End: 2022-05-11 | Stop reason: HOSPADM

## 2022-05-11 RX ORDER — SODIUM CHLORIDE, SODIUM LACTATE, POTASSIUM CHLORIDE, CALCIUM CHLORIDE 600; 310; 30; 20 MG/100ML; MG/100ML; MG/100ML; MG/100ML
100 INJECTION, SOLUTION INTRAVENOUS CONTINUOUS
Status: DISCONTINUED | OUTPATIENT
Start: 2022-05-11 | End: 2022-05-12 | Stop reason: HOSPADM

## 2022-05-11 RX ORDER — SODIUM CHLORIDE, SODIUM LACTATE, POTASSIUM CHLORIDE, CALCIUM CHLORIDE 600; 310; 30; 20 MG/100ML; MG/100ML; MG/100ML; MG/100ML
9 INJECTION, SOLUTION INTRAVENOUS CONTINUOUS PRN
Status: DISCONTINUED | OUTPATIENT
Start: 2022-05-11 | End: 2022-05-12 | Stop reason: HOSPADM

## 2022-05-11 RX ORDER — ROCURONIUM BROMIDE 10 MG/ML
INJECTION, SOLUTION INTRAVENOUS AS NEEDED
Status: DISCONTINUED | OUTPATIENT
Start: 2022-05-11 | End: 2022-05-11 | Stop reason: SURG

## 2022-05-11 RX ORDER — FAMOTIDINE 20 MG/1
40 TABLET, FILM COATED ORAL DAILY
Status: DISCONTINUED | OUTPATIENT
Start: 2022-05-11 | End: 2022-05-12 | Stop reason: HOSPADM

## 2022-05-11 RX ORDER — ACETAMINOPHEN 500 MG
1000 TABLET ORAL ONCE
Status: COMPLETED | OUTPATIENT
Start: 2022-05-11 | End: 2022-05-11

## 2022-05-11 RX ORDER — ACETAMINOPHEN 325 MG/1
325 TABLET ORAL EVERY 4 HOURS PRN
Status: DISCONTINUED | OUTPATIENT
Start: 2022-05-11 | End: 2022-05-12 | Stop reason: HOSPADM

## 2022-05-11 RX ORDER — ONDANSETRON 2 MG/ML
4 INJECTION INTRAMUSCULAR; INTRAVENOUS EVERY 6 HOURS PRN
Status: DISCONTINUED | OUTPATIENT
Start: 2022-05-11 | End: 2022-05-12 | Stop reason: HOSPADM

## 2022-05-11 RX ADMIN — HYDROMORPHONE HYDROCHLORIDE 0.5 MG: 1 INJECTION, SOLUTION INTRAMUSCULAR; INTRAVENOUS; SUBCUTANEOUS at 11:22

## 2022-05-11 RX ADMIN — KETOROLAC TROMETHAMINE 15 MG: 15 INJECTION, SOLUTION INTRAMUSCULAR; INTRAVENOUS at 14:58

## 2022-05-11 RX ADMIN — OXYCODONE HYDROCHLORIDE 5 MG: 5 TABLET ORAL at 13:59

## 2022-05-11 RX ADMIN — KETOROLAC TROMETHAMINE 15 MG: 15 INJECTION, SOLUTION INTRAMUSCULAR; INTRAVENOUS at 20:28

## 2022-05-11 RX ADMIN — HYDROMORPHONE HYDROCHLORIDE 0.5 MG: 1 INJECTION, SOLUTION INTRAMUSCULAR; INTRAVENOUS; SUBCUTANEOUS at 12:03

## 2022-05-11 RX ADMIN — ROCURONIUM BROMIDE 20 MG: 10 INJECTION INTRAVENOUS at 11:29

## 2022-05-11 RX ADMIN — FENTANYL CITRATE 50 MCG: 50 INJECTION, SOLUTION INTRAMUSCULAR; INTRAVENOUS at 10:31

## 2022-05-11 RX ADMIN — ROCURONIUM BROMIDE 20 MG: 10 INJECTION INTRAVENOUS at 12:35

## 2022-05-11 RX ADMIN — CELECOXIB 200 MG: 100 CAPSULE ORAL at 09:26

## 2022-05-11 RX ADMIN — ROCURONIUM BROMIDE 50 MG: 10 INJECTION INTRAVENOUS at 10:01

## 2022-05-11 RX ADMIN — SUGAMMADEX 200 MG: 100 INJECTION, SOLUTION INTRAVENOUS at 13:14

## 2022-05-11 RX ADMIN — SODIUM CHLORIDE, POTASSIUM CHLORIDE, SODIUM LACTATE AND CALCIUM CHLORIDE: 600; 310; 30; 20 INJECTION, SOLUTION INTRAVENOUS at 11:43

## 2022-05-11 RX ADMIN — HYDROMORPHONE HYDROCHLORIDE 0.5 MG: 1 INJECTION, SOLUTION INTRAMUSCULAR; INTRAVENOUS; SUBCUTANEOUS at 13:47

## 2022-05-11 RX ADMIN — FAMOTIDINE 40 MG: 20 TABLET ORAL at 14:57

## 2022-05-11 RX ADMIN — CLINDAMYCIN IN 5 PERCENT DEXTROSE 900 MG: 18 INJECTION, SOLUTION INTRAVENOUS at 22:33

## 2022-05-11 RX ADMIN — MEPERIDINE HYDROCHLORIDE 12.5 MG: 25 INJECTION INTRAMUSCULAR; INTRAVENOUS; SUBCUTANEOUS at 13:33

## 2022-05-11 RX ADMIN — MIDAZOLAM HYDROCHLORIDE 2 MG: 1 INJECTION, SOLUTION INTRAMUSCULAR; INTRAVENOUS at 09:53

## 2022-05-11 RX ADMIN — ACETAMINOPHEN 1000 MG: 500 TABLET ORAL at 09:26

## 2022-05-11 RX ADMIN — Medication 3 ML: at 20:28

## 2022-05-11 RX ADMIN — SODIUM CHLORIDE, POTASSIUM CHLORIDE, SODIUM LACTATE AND CALCIUM CHLORIDE 9 ML/HR: 600; 310; 30; 20 INJECTION, SOLUTION INTRAVENOUS at 09:25

## 2022-05-11 RX ADMIN — TRANEXAMIC ACID 1000 MG: 10 INJECTION, SOLUTION INTRAVENOUS at 12:57

## 2022-05-11 RX ADMIN — HYDROMORPHONE HYDROCHLORIDE 0.5 MG: 1 INJECTION, SOLUTION INTRAMUSCULAR; INTRAVENOUS; SUBCUTANEOUS at 13:30

## 2022-05-11 RX ADMIN — ONDANSETRON 4 MG: 2 INJECTION INTRAMUSCULAR; INTRAVENOUS at 10:17

## 2022-05-11 RX ADMIN — FENTANYL CITRATE 50 MCG: 50 INJECTION, SOLUTION INTRAMUSCULAR; INTRAVENOUS at 10:01

## 2022-05-11 RX ADMIN — DEXAMETHASONE SODIUM PHOSPHATE 4 MG: 4 INJECTION, SOLUTION INTRA-ARTICULAR; INTRALESIONAL; INTRAMUSCULAR; INTRAVENOUS; SOFT TISSUE at 10:17

## 2022-05-11 RX ADMIN — SODIUM CHLORIDE, POTASSIUM CHLORIDE, SODIUM LACTATE AND CALCIUM CHLORIDE 100 ML/HR: 600; 310; 30; 20 INJECTION, SOLUTION INTRAVENOUS at 22:46

## 2022-05-11 RX ADMIN — HYDROMORPHONE HYDROCHLORIDE 0.5 MG: 1 INJECTION, SOLUTION INTRAMUSCULAR; INTRAVENOUS; SUBCUTANEOUS at 13:54

## 2022-05-11 RX ADMIN — CLINDAMYCIN IN 5 PERCENT DEXTROSE 900 MG: 18 INJECTION, SOLUTION INTRAVENOUS at 09:59

## 2022-05-11 RX ADMIN — OXYCODONE HYDROCHLORIDE AND ACETAMINOPHEN 2 TABLET: 7.5; 325 TABLET ORAL at 18:28

## 2022-05-11 RX ADMIN — LIDOCAINE HYDROCHLORIDE 100 MG: 20 INJECTION, SOLUTION EPIDURAL; INFILTRATION; INTRACAUDAL; PERINEURAL at 10:01

## 2022-05-11 RX ADMIN — HYDROMORPHONE HYDROCHLORIDE 0.5 MG: 1 INJECTION, SOLUTION INTRAMUSCULAR; INTRAVENOUS; SUBCUTANEOUS at 12:31

## 2022-05-11 RX ADMIN — ROCURONIUM BROMIDE 10 MG: 10 INJECTION INTRAVENOUS at 11:44

## 2022-05-11 RX ADMIN — SODIUM CHLORIDE, POTASSIUM CHLORIDE, SODIUM LACTATE AND CALCIUM CHLORIDE 100 ML/HR: 600; 310; 30; 20 INJECTION, SOLUTION INTRAVENOUS at 14:58

## 2022-05-11 RX ADMIN — HYDROMORPHONE HYDROCHLORIDE 0.5 MG: 1 INJECTION, SOLUTION INTRAMUSCULAR; INTRAVENOUS; SUBCUTANEOUS at 13:25

## 2022-05-11 RX ADMIN — ACETAMINOPHEN 1000 MG: 500 TABLET ORAL at 14:57

## 2022-05-11 RX ADMIN — TRANEXAMIC ACID 1000 MG: 10 INJECTION, SOLUTION INTRAVENOUS at 09:53

## 2022-05-11 RX ADMIN — CLINDAMYCIN IN 5 PERCENT DEXTROSE 900 MG: 18 INJECTION, SOLUTION INTRAVENOUS at 14:57

## 2022-05-11 RX ADMIN — ROCURONIUM BROMIDE 20 MG: 10 INJECTION INTRAVENOUS at 12:02

## 2022-05-11 RX ADMIN — ACETAMINOPHEN 1000 MG: 500 TABLET ORAL at 22:33

## 2022-05-11 RX ADMIN — PROPOFOL 180 MG: 10 INJECTION, EMULSION INTRAVENOUS at 10:01

## 2022-05-11 NOTE — ANESTHESIA PREPROCEDURE EVALUATION
Anesthesia Evaluation     Patient summary reviewed and Nursing notes reviewed   no history of anesthetic complications:  NPO Solid Status: > 8 hours  NPO Liquid Status: > 2 hours           Airway   Mallampati: II  TM distance: >3 FB  Neck ROM: full  No difficulty expected  Comment: Dry lesion on lower lip  Dental      Pulmonary - negative pulmonary ROS and normal exam    breath sounds clear to auscultation  Cardiovascular - normal exam  Exercise tolerance: good (4-7 METS)    Rhythm: regular  Rate: normal    (+) hypertension, hyperlipidemia,       Neuro/Psych- negative ROS  GI/Hepatic/Renal/Endo - negative ROS     Musculoskeletal     Abdominal    Substance History - negative use     OB/GYN negative ob/gyn ROS         Other   arthritis,                      Anesthesia Plan    ASA 2     general   (Pt prefers general vs spinal)    Anesthetic plan, all risks, benefits, and alternatives have been provided, discussed and informed consent has been obtained with: patient and spouse/significant other.        CODE STATUS:

## 2022-05-11 NOTE — PROGRESS NOTES
[facility]     Progress Note    Patient Name: Socrates Owen Jr.  : 1975  MRN: [id number]  Primary Care Physician:  Antonino Nicole MD  Date of admission: 2022      Subjective   Brief summary.  Right hip pain      HPI:  Mr. Owen is a 46-year-old male well-known to me, admitted for right hip replacement.  Patient had fracture 6 months ago on the right hip, had surgical intervention which did not heal well.  He has chronic pain and difficulty walking.  Patient is postsurgery.  Awake alert sitting in chair, not much pain in the hip.  Complains of some numbness in the left hand and fingers.  No other complaints no nausea no chest pain no shortness of breath    Review of Systems     Fatigue  Tingling in all 5 fingertips on the left side  No chest pain shortness of breath  No nausea      Objective     Vitals:   Temp:  [96.9 °F (36.1 °C)-99.4 °F (37.4 °C)] 99.4 °F (37.4 °C)  Heart Rate:  [60-82] 82  Resp:  [12-18] 18  BP: (105-156)/(65-99) 126/65  Flow (L/min):  [2] 2  FiO2 (%):  [0.6 %-100 %] 100 %    Physical Exam :     Middle-aged male not in acute distress  Neck supple  Heart regular  Lungs clear  Abdomen soft  Left hand with good peripheral pulses and capillary refill, strength normal.  Right hip postsurgical dressing*      Result Review:  I have personally reviewed the results from the time of this admission to 2022 19:40 EDT and agree with these findings:  []  Laboratory  []  Microbiology  []  Radiology  []  EKG/Telemetry   []  Cardiology/Vascular   []  Pathology  []  Old records  []  Other:           Assessment / Plan       Active Hospital Problems:  Active Hospital Problems    Diagnosis    • **Closed comminuted fracture of hip with nonunion    • Numbness of fingers    • Hypertension      ON MEDS     • Arthritis of right hip        Plan:   Patient stable postop, complains of some tingling and numbness in fingers ongoing since surgery.  Could be positional.  Patient reported this to the surgeon and  nursing staff.  Advised to watch for it.  Doubt any cardiac issue.  Discussed with patient that vascular bundle appears to be normal, could be related to stretching of the nerve during surgery and positioning.  We will continue to monitor.  Restart essential meds.  DVT and GI prophylaxis per protocol per orthopedic surgeon.       DVT prophylaxis:  Medical and mechanical DVT prophylaxis orders are present.    CODE STATUS:              Electronically signed by Antonino Nicole MD, 05/11/22, 7:35 PM EDT.

## 2022-05-11 NOTE — PLAN OF CARE
Goal Outcome Evaluation:       Patient has been up in chair resting since coming to [facility] from surgery. Patient has been calm and cooperative, with no complaints at this time. Significant other is at bedside.

## 2022-05-11 NOTE — THERAPY EVALUATION
Acute Care - Physical Therapy Initial Evaluation   [place]     Patient Name: Socrates Owen Jr.  : 1975  MRN: [id number]  Today's Date: 2022   Admit date: 2022     Referring Physician: Antonino Nicole MD     Surgery Date:2022   Procedure(s) (LRB):  TOTAL HIP ARTHROPLASTY ANTERIOR AND REMOVAL OF HARDWARE (Right)  HARDWARE REMOVAL RIGHT HIP (Right)           Visit Dx:     ICD-10-CM ICD-9-CM   1. Difficulty walking  R26.2 719.7   2. Closed comminuted fracture of right hip with nonunion, subsequent encounter  S72.091K 733.82     Patient Active Problem List   Diagnosis   • Comminuted fracture of right hip (HCC)   • Right Femoral Neck Open Reduction Internal Fixation    • Closed comminuted fracture of hip with nonunion   • Arthritis of right hip     Past Medical History:   Diagnosis Date   • Arthritis    • Elevated cholesterol    • Femur fracture, right (HCC)     2021 ORIF   • Hypertension     ON MEDS   • Kidney stone      Past Surgical History:   Procedure Laterality Date   • HIP OPEN REDUCTION Right 2021    Procedure: FEMORAL NECK OPEN REDUCTION INTERNAL FIXATION;  Surgeon: Peterson Michelle MD;  Location: [facility]  Service: Orthopedics;  Laterality: Right;   • KNEE SURGERY Right     MENISCUS TEAR 2018     PT Assessment (last 12 hours)     PT Evaluation and Treatment     Row Name 22 1447          Physical Therapy Time and Intention    Subjective Information complains of;pain (P)   -SH     Document Type evaluation (P)   -SH     Mode of Treatment individual therapy;physical therapy (P)   -SH     Patient Effort good (P)   -SH     Symptoms Noted During/After Treatment increased pain (P)   -SH     Row Name 22 1447          General Information    Patient Profile Reviewed yes (P)   -SH     Patient Observations alert;cooperative;agree to therapy (P)   -SH     Prior Level of Function independent: (P)   -SH     Equipment Currently Used at Home cane, straight;walker, rolling (P)    -     Barriers to Rehab none identified (P)   -[facility] Name 05/11/22 1447          Previous Level of Function/Home Environm    Bed Mobility, Premorbid Functional Level independent (P)   -     Transfers, Premorbid Functional Level independent (P)   -     Household Ambulation, Premorbid Functional Level independent (P)   -     Stairs, Premorbid Functional Level independent (P)   -     Community Ambulation, Premorbid Functional Level independent (P)   -SH     Row Name 05/11/22 1447          Living Environment    Current Living Arrangements home (P)   -     Home Accessibility stairs to enter home (P)   -     People in Home spouse (P)   -     Primary Care Provided by self (P)   -SH     Row Name 05/11/22 1447          Home Main Entrance    Number of Stairs, Main Entrance two (P)   -     Stair Railings, Main Entrance none (P)   -SH     Row Name 05/11/22 1447          Home Use of Assistive/Adaptive Equipment    Equipment Currently Used at Home cane, straight;walker, rolling;shower chair;commode (P)   -SH     Row Name 05/11/22 1447          Pain    Pain Location - Side/Orientation Right (P)   -     Pain Location - hip (P)   -SH     Row Name 05/11/22 1447          Cognition    Orientation Status (Cognition) oriented x 4 (P)   -SH     Row Name 05/11/22 1447          Range of Motion (ROM)    Range of Motion right lower extremity ROM (P)   -     Right Lower Extremity (ROM) right LE ROM is WFL except;hip (P)   -     Hip, Right (Range of Motion)  (P)   -SH     Row Name 05/11/22 1447          Strength (Manual Muscle Testing)    Strength (Manual Muscle Testing) strength is WNL (P)   Painful with RLE knee extension and hip flexion  -SH     Row Name 05/11/22 1447          Bed Mobility    Bed Mobility bed mobility (all) activities (P)   -     All Activities, [place] (Bed Mobility) standby assist (P)   -     Bed Mobility, Safety Issues decreased use of legs for bridging/pushing (P)   -[facility]  Name 05/11/22 1447          Transfers    Transfers bed-chair transfer;sit-stand transfer (P)   -     Bed-Chair [place] (Transfers) contact guard;verbal cues;nonverbal cues (demo/gesture) (P)   -     Assistive Device (Bed-Chair Transfers) walker, front-wheeled (P)   -     Sit-Stand [place] (Transfers) contact guard;verbal cues (P)   -     Row Name 05/11/22 1447          Sit-Stand Transfer    Assistive Device (Sit-Stand Transfers) walker, front-wheeled (P)   -     Row Name 05/11/22 1447          Gait/Stairs (Locomotion)    Gait/Stairs Locomotion gait/ambulation assistive device (P)   -     [place] Level (Gait) contact guard;verbal cues (P)   -     Assistive Device (Gait) walker, front-wheeled (P)   -     Ambulated day of surgery or within 4 hours of PACU discharge yes (P)   -     Distance in Feet (Gait) 15 (P)   -     Pattern (Gait) 4-point;step-to (P)   -     Deviations/Abnormal Patterns (Gait) antalgic;gait speed decreased;weight shifting decreased (P)   -     Right Sided Gait Deviations weight shift ability decreased (P)   -     Row Name 05/11/22 1447          Safety Issues, Functional Mobility    Impairments Affecting Function (Mobility) balance;endurance/activity tolerance;pain;strength;range of motion (ROM) (P)   -     Row Name 05/11/22 1447          Balance    Balance Assessment standing dynamic balance (P)   -     Dynamic Standing Balance contact guard (P)   -     Position/Device Used, Standing Balance walker, front-wheeled (P)   -     Balance Interventions standing;dynamic;weight shifting activity (P)   -     Row Name             [REMOVED] Wound 08/22/21 anterior nose Abrasion    Wound - Properties Group Placement Date: 08/22/21  -HR Present on Hospital Admission: Y  -HR Orientation: anterior  -HR Location: nose  -HR Primary Wound Type: Abrasion  -HR Removal Date: 05/11/22  -ROGERIO Removal Time: 0920 -ROGERIO, HEALED      Retired Wound - Properties Group Placement  Date: 08/22/21  -HR Present on Hospital Admission: Y  -HR Orientation: anterior  -HR Location: nose  -HR Primary Wound Type: Abrasion  -HR Removal Date: 05/11/22  -ROGERIO Removal Time: 0920 -ROGERIO, HEALED      Retired Wound - Properties Group Date first assessed: 08/22/21  -HR Present on Hospital Admission: Y  -HR Location: nose  -HR Primary Wound Type: Abrasion  -HR Resolution Date: 05/11/22 -ROGERIO Resolution Time: 0920 -ROGERIO, HEALED      Row Name             [REMOVED] Wound 08/22/21 Right anterior cheek Abrasion    Wound - Properties Group Placement Date: 08/22/21  -HR Present on Hospital Admission: Y  -HR Side: Right  -HR Orientation: anterior  -HR Location: cheek  -HR Primary Wound Type: Abrasion  -HR Removal Date: 05/11/22  -ROGERIO Removal Time: 0921 -KC Wound Outcome: Healed  -ROGERIO     Retired Wound - Properties Group Placement Date: 08/22/21  -HR Present on Hospital Admission: Y  -HR Side: Right  -HR Orientation: anterior  -HR Location: cheek  -HR Primary Wound Type: Abrasion  -HR Removal Date: 05/11/22  -ROGERIO Removal Time: 0921 -KC Wound Outcome: Healed  -ROGERIO     Retired Wound - Properties Group Date first assessed: 08/22/21  -HR Present on Hospital Admission: Y  -HR Side: Right  -HR Location: cheek  -HR Primary Wound Type: Abrasion  -HR Resolution Date: 05/11/22 -ROGERIO Resolution Time: 0921 -KC Wound Outcome: Healed  -ROGERIO     Row Name             [REMOVED] Wound 08/22/21 Left anterior cheek Abrasion    Wound - Properties Group Placement Date: 08/22/21  -HR Present on Hospital Admission: Y  -HR Side: Left  -HR Orientation: anterior  -HR Location: cheek  -HR Primary Wound Type: Abrasion  -HR Removal Date: 05/11/22  -ROGERIO Removal Time: 0921 -KC Wound Outcome: Healed  -ROGERIO     Retired Wound - Properties Group Placement Date: 08/22/21  -HR Present on Hospital Admission: Y  -HR Side: Left  -HR Orientation: anterior  -HR Location: cheek  -HR Primary Wound Type: Abrasion  -HR Removal Date: 05/11/22  -ROGERIO Removal Time: 0921 -KC Wound  Outcome: Healed  -ROGERIO     Retired Wound - Properties Group Date first assessed: 08/22/21  -HR Present on Hospital Admission: Y  -HR Side: Left  -HR Location: cheek  -HR Primary Wound Type: Abrasion  -HR Resolution Date: 05/11/22  -ROGERIO Resolution Time: 0921 -KC Wound Outcome: Healed  -ROGERIO     Row Name             [REMOVED] Wound 08/23/21 1431 Right hand Traumatic    Wound - Properties Group Placement Date: 08/23/21  -CB Placement Time: 1431  -CB Side: Right  -CB Location: hand  -CB Primary Wound Type: Traumatic  -CB Removal Date: 05/11/22  -ROGERIO Removal Time: 0921 -ROGERIO Wound Outcome: Healed  -ROGERIO     Retired Wound - Properties Group Placement Date: 08/23/21  -CB Placement Time: 1431  -CB Side: Right  -CB Location: hand  -CB Primary Wound Type: Traumatic  -CB Removal Date: 05/11/22  -ROGERIO Removal Time: 0921 -KC Wound Outcome: Healed  -ROGERIO     Retired Wound - Properties Group Date first assessed: 08/23/21  -CB Time first assessed: 1431  -CB Side: Right  -CB Location: hand  -CB Primary Wound Type: Traumatic  -CB Resolution Date: 05/11/22  -ROGERIO Resolution Time: 0921 -ROGERIO Wound Outcome: Healed  -ROGERIO     Row Name             [REMOVED] Wound 08/23/21 1921 Right lateral greater trochanter Incision    Wound - Properties Group Placement Date: 08/23/21  -BW Placement Time: 1921 -BW Present on Hospital Admission: N  -BW Side: Right  -BW Orientation: lateral  -BW Location: greater trochanter  -BW Primary Wound Type: Incision  -BW Removal Date: 05/11/22  -ROGERIO Removal Time: 0922 -ROGERIO Wound Outcome: Other  -BW, DRESSED AFTER PROCEDURE.      Retired Wound - Properties Group Placement Date: 08/23/21  -BW Placement Time: 1921 -BW Present on Hospital Admission: N  -BW Side: Right  -BW Orientation: lateral  -BW Location: greater trochanter  -BW Primary Wound Type: Incision  -BW Removal Date: 05/11/22  -ROGERIO Removal Time: 0922 -ROGERIO Wound Outcome: Other  -BW, DRESSED AFTER PROCEDURE.      Retired Wound - Properties Group Date first assessed:  08/23/21  -BW Time first assessed: 1921 -BW Present on Hospital Admission: N  -BW Side: Right  -BW Location: greater trochanter  -BW Primary Wound Type: Incision  -BW Resolution Date: 05/11/22 -KC Resolution Time: 0922 -KC Wound Outcome: Other  -BW, DRESSED AFTER PROCEDURE.      Row Name             [REMOVED] Wound 08/23/21 1951 Right lateral thigh Incision    Wound - Properties Group Placement Date: 08/23/21  -BW Placement Time: 1951 -BW Present on Hospital Admission: N  -BW Side: Right  -BW Orientation: lateral  -BW Location: thigh  -BW Primary Wound Type: Incision  -BW Removal Date: 05/11/22 -KC Removal Time: 0922 -KC, HEALED  Wound Outcome: Other  -BW, DRESSED AFTER PROCEDURE.      Retired Wound - Properties Group Placement Date: 08/23/21  -BW Placement Time: 1951 -BW Present on Hospital Admission: N  -BW Side: Right  -BW Orientation: lateral  -BW Location: thigh  -BW Primary Wound Type: Incision  -BW Removal Date: 05/11/22 -KC Removal Time: 0922 -KC, HEALED  Wound Outcome: Other  -BW, DRESSED AFTER PROCEDURE.      Retired Wound - Properties Group Date first assessed: 08/23/21  -BW Time first assessed: 1951 -BW Present on Hospital Admission: N  -BW Side: Right  -BW Location: thigh  -BW Primary Wound Type: Incision  -BW Resolution Date: 05/11/22 -KC Resolution Time: 0922 -KC, HEALED  Wound Outcome: Other  -BW, DRESSED AFTER PROCEDURE.      Row Name             [REMOVED] Wound 08/23/21 1958 Right lateral thigh    Wound - Properties Group Placement Date: 08/23/21  -BW Placement Time: 1958 -BW Present on Hospital Admission: N  -BW Side: Right  -BW Orientation: lateral  -BW Location: thigh  -BW Removal Date: 05/11/22 -ROGERIO Removal Time: 0922 -KC, HEALED  Wound Outcome: Other  -BW, DRESSED AFTER SURGERY.      Retired Wound - Properties Group Placement Date: 08/23/21  -BW Placement Time: 1958 -BW Present on Hospital Admission: N  -BW Side: Right  -BW Orientation: lateral  -BW Location: thigh  -BW Removal  Date: 05/11/22  -ROGERIO Removal Time: 0922 -ROGERIO, HEALED  Wound Outcome: Other  -BW, DRESSED AFTER SURGERY.      Retired Wound - Properties Group Date first assessed: 08/23/21  -BW Time first assessed: 1958 -BW Present on Hospital Admission: N  -BW Side: Right  -BW Location: thigh  -BW Resolution Date: 05/11/22  -ROGERIO Resolution Time: 0922 -ROGERIO, HEALED  Wound Outcome: Other  -BW, DRESSED AFTER SURGERY.      Row Name             [REMOVED] Wound 08/23/21 1431 Left hand Traumatic    Wound - Properties Group Placement Date: 08/23/21  -CB Placement Time: 1431  -CB Present on Hospital Admission: Y  -CB Side: Left  -CB Location: hand  -CB Primary Wound Type: Traumatic  -CB Removal Date: 05/11/22  -ROGERIO Removal Time: 0923 -ROGERIO, HEALED      Retired Wound - Properties Group Placement Date: 08/23/21  -CB Placement Time: 1431  -CB Present on Hospital Admission: Y  -CB Side: Left  -CB Location: hand  -CB Primary Wound Type: Traumatic  -CB Removal Date: 05/11/22  -ROGERIO Removal Time: 0923 -ROGERIO, HEALED      Retired Wound - Properties Group Date first assessed: 08/23/21  -CB Time first assessed: 1431  -CB Present on Hospital Admission: Y  -CB Side: Left  -CB Location: hand  -CB Primary Wound Type: Traumatic  -CB Resolution Date: 05/11/22  -ROGERIO Resolution Time: 0923 -ROGERIO, HEALED      Row Name             [REMOVED] Wound 08/22/21 Left posterior thumb Abrasion    Wound - Properties Group Placement Date: 08/22/21  -HR Present on Hospital Admission: Y  -HR Side: Left  -HR Orientation: posterior  -HR Location: thumb  -HR Primary Wound Type: Abrasion  -HR Removal Date: 05/11/22  -ROGERIO Removal Time: 0923 -ROGERIO, HEALED      Retired Wound - Properties Group Placement Date: 08/22/21  -HR Present on Hospital Admission: Y  -HR Side: Left  -HR Orientation: posterior  -HR Location: thumb  -HR Primary Wound Type: Abrasion  -HR Removal Date: 05/11/22  -ROGERIO Removal Time: 0923 -ROGERIO, HEALED      Retired Wound - Properties Group Date first assessed: 08/22/21  -HR  Present on Hospital Admission: Y  -HR Side: Left  -HR Location: thumb  -HR Primary Wound Type: Abrasion  -HR Resolution Date: 05/11/22  -ROGERIO Resolution Time: 0923  -ROGERIO, HEALED      Row Name             Wound 05/11/22 0924 lower lip Other (comment)    Wound - Properties Group Placement Date: 05/11/22  -ROGERIO Placement Time: 0924  -ROGERIO Present on Hospital Admission: Y  -ROGERIO Orientation: lower  -ROGERIO Location: lip  -ROGERIO Primary Wound Type: Other  -ROGERIO, SCAB      Retired Wound - Properties Group Placement Date: 05/11/22  -ROGERIO Placement Time: 0924  -ROGERIO Present on Hospital Admission: Y  -ROGERIO Orientation: lower  -ROGERIO Location: lip  -ROGERIO Primary Wound Type: Other  -ROGERIO, SCAB      Retired Wound - Properties Group Date first assessed: 05/11/22  -ROGERIO Time first assessed: 0924  -ROGERIO Present on Hospital Admission: Y  -ROGERIO Location: lip  -ROGERIO Primary Wound Type: Other  -ROGERIO, SCAB      Row Name             Wound 05/11/22 0925 Right lower leg Abrasion    Wound - Properties Group Placement Date: 05/11/22  -ROGERIO Placement Time: 0925  -ROGERIO Present on Hospital Admission: Y  -ROGERIO Side: Right  -ROGERIO Orientation: lower  -ROGERIO Location: leg  -ROGERIO Primary Wound Type: Abrasion  -ROGERIO     Retired Wound - Properties Group Placement Date: 05/11/22  -ROGERIO Placement Time: 0925  -ROGERIO Present on Hospital Admission: Y  -ROGERIO Side: Right  -ROGERIO Orientation: lower  -ROGERIO Location: leg  -ROGERIO Primary Wound Type: Abrasion  -ROGERIO     Retired Wound - Properties Group Date first assessed: 05/11/22  -ROGERIO Time first assessed: 0925  -ROGERIO Present on Hospital Admission: Y  -ROGERIO Side: Right  -ROGERIO Location: leg  -ROGERIO Primary Wound Type: Abrasion  -ROGERIO     Row Name             Wound 05/11/22 1042 Right anterior hip Incision    Wound - Properties Group Placement Date: 05/11/22  -WM Placement Time: 1042  -WM Present on Hospital Admission: N  -WM Side: Right  -WM Orientation: anterior  -WM Location: hip  -WM Primary Wound Type: Incision  -WM     Retired Wound - Properties Group Placement Date: 05/11/22  -WM  Placement Time: 1042  -WM Present on Hospital Admission: N  -WM Side: Right  -WM Orientation: anterior  -WM Location: hip  -WM Primary Wound Type: Incision  -WM     Retired Wound - Properties Group Date first assessed: 05/11/22  -WM Time first assessed: 1042  -WM Present on Hospital Admission: N  -WM Side: Right  -WM Location: hip  -WM Primary Wound Type: Incision  -WM     Row Name 05/11/22 1447          Plan of Care Review    Plan of Care Reviewed With patient;spouse (P)   -     Outcome Evaluation Pt has functional deficits with transfers and ambulation.  Skilled therapy is required to address deficits. Recommend return home with outpatient therapy services upon d/c. (P)   -     Row Name 05/11/22 1447          Positioning and Restraints    Pre-Treatment Position in bed (P)   -     Post Treatment Position chair (P)   -     In Chair sitting;call light within reach;with nsg (P)   -     Row Name 05/11/22 1441          Therapy Assessment/Plan (PT)    Patient/Family Therapy Goals Statement (PT) Walk without assist. (P)   -     Rehab Potential (PT) good, to achieve stated therapy goals (P)   -     Criteria for Skilled Interventions Met (PT) skilled treatment is necessary (P)   -     Therapy Frequency (PT) 2 times/day (P)   -     Predicted Duration of Therapy Intervention (PT) 10 (P)   -     Problem List (PT) problems related to;balance;mobility;range of motion (ROM);strength;pain (P)   -     Activity Limitations Related to Problem List (PT) unable to ambulate safely;unable to transfer safely (P)   -     Row Name 05/11/22 1440          Therapy Plan Review/Discharge Plan (PT)    Therapy Plan Review (PT) evaluation/treatment results reviewed;care plan/treatment goals reviewed;participants voiced agreement with care plan;participants included;patient;spouse/significant other (P)   -     Row Name 05/11/22 1445          Physical Therapy Goals    Transfer Goal Selection (PT) transfer, PT goal 1 (P)    -SH     Gait Training Goal Selection (PT) gait training, PT goal 1 (P)   -SH     Row Name 05/11/22 1447          Transfer Goal 1 (PT)    Activity/Assistive Device (Transfer Goal 1, PT) transfers, all (P)   -SH     [place] Level/Cues Needed (Transfer Goal 1, PT) independent (P)   -SH     Time Frame (Transfer Goal 1, PT) long term goal (LTG);10 days (P)   -     Row Name 05/11/22 1447          Gait Training Goal 1 (PT)    Activity/Assistive Device (Gait Training Goal 1, PT) gait (walking locomotion);assistive device use;walker, rolling (P)   -SH     [place] Level (Gait Training Goal 1, PT) independent (P)   -SH     Distance (Gait Training Goal 1, PT) 200 (P)   -SH     Time Frame (Gait Training Goal 1, PT) long term goal (LTG);10 days (P)   -           User Key  (r) = Recorded By, (t) = Taken By, (c) = Cosigned By    Initials Name Provider Type     Farhat Ray RN Registered Nurse    Riky Peña RN Registered Nurse    Malina Harding RN Registered Nurse    Kristen Nolasco, RN Registered Nurse    Kristen Nolasco RN Registered Nurse    Marivel Castanon, RN Registered Nurse    Rod Pacheco, PT Student PT Student                Physical Therapy Education                 Title: PT OT SLP Therapies (Done)     Topic: Physical Therapy (Done)     Point: Mobility training (Done)     Learning Progress Summary           Patient Acceptance, E,TB, VU by  at 5/11/2022 1457                   Point: Home exercise program (Done)     Learning Progress Summary           Patient Acceptance, E,TB, VU by  at 5/11/2022 1457                   Point: Body mechanics (Done)     Learning Progress Summary           Patient Acceptance, E,TB, VU by  at 5/11/2022 1457                   Point: Precautions (Done)     Learning Progress Summary           Patient Acceptance, E,TB, VU by  at 5/11/2022 1457                               User Key     Initials Effective Dates Name Provider Type  Discipline     04/08/22 -  Rod Gracia, PT Student PT Student PT              PT Recommendation and Plan  Anticipated Discharge Disposition (PT): (P) home with outpatient therapy services  Planned Therapy Interventions (PT): (P) balance training, bed mobility training, gait training, home exercise program, patient/family education, ROM (range of motion), stair training, strengthening, transfer training  Therapy Frequency (PT): (P) 2 times/day  Plan of Care Reviewed With: (P) patient, spouse  Outcome Evaluation: (P) Pt has functional deficits with transfers and ambulation.  Skilled therapy is required to address deficits. Recommend return home with outpatient therapy services upon d/c.   Outcome Measures     Row Name 05/11/22 1456             How much help from another person do you currently need...    Turning from your back to your side while in flat bed without using bedrails? 4 (P)   -SH      Moving from lying on back to sitting on the side of a flat bed without bedrails? 4 (P)   -SH      Moving to and from a bed to a chair (including a wheelchair)? 3 (P)   -SH      Standing up from a chair using your arms (e.g., wheelchair, bedside chair)? 3 (P)   -SH      Climbing 3-5 steps with a railing? 3 (P)   -SH      To walk in hospital room? 3 (P)   -SH      AM-PAC 6 Clicks Score (PT) 20 (P)   -SH              Functional Assessment    Outcome Measure Options AM-PAC 6 Clicks Basic Mobility (PT) (P)   -SH            User Key  (r) = Recorded By, (t) = Taken By, (c) = Cosigned By    Initials Name Provider Type     Rod Gracia, PT Student PT Student                 Time Calculation:    PT Charges     Row Name 05/11/22 1449 05/11/22 1447          Time Calculation    PT Received On 05/11/22  -DELIO --  -DELIO     PT Goal Re-Cert Due Date 05/11/22  -DELIO --  -DELIO            Untimed Charges    PT Eval/Re-eval Minutes 34  -DELIO --  -DELIO            Total Minutes    Untimed Charges Total Minutes 34  -DELIO --  -DELIO       Total Minutes 34  -DELIO --  -DELIO           User Key  (r) = Recorded By, (t) = Taken By, (c) = Cosigned By    Initials Name Provider Type    Liborio Palacios, PT Physical Therapist               Patient evaluation and/or treatment was performed under the direct supervision of a licensed physical therapist. Liborio Batres, PT      PT G-Codes  Outcome Measure Options: (P) AM-PAC 6 Clicks Basic Mobility (PT)  AM-PAC 6 Clicks Score (PT): (P) 20    Rod Gracia, PT Student  5/11/2022

## 2022-05-11 NOTE — BRIEF OP NOTE
TOTAL HIP ARTHROPLASTY ANTERIOR, HARDWARE REMOVAL  Progress Note    Socrates Owen   5/11/2022    Pre-op Diagnosis:   Closed comminuted fracture of right hip with nonunion, subsequent encounter [S72.091K]       Post-Op Diagnosis Codes:     * Closed comminuted fracture of right hip with nonunion, subsequent encounter [S72.091K]    Procedure/CPT® Codes:        Procedure(s):  TOTAL HIP ARTHROPLASTY ANTERIOR AND REMOVAL OF HARDWARE  HARDWARE REMOVAL RIGHT HIP    Surgeon(s):  Peterson Michelle MD    Anesthesia: General    Staff:   Circulator: Farhat Ray RN  Radiology Technologist: Gissel Davis  Scrub Person: Arin Zaragoza RN; Mic Jimenez  Assistant: Yusuf Rubalcava PA; Fiona Devries  Other: Dimitrios Cohen RN  Assistant: Yusuf Rubalcava PA; Fiona Devries      Estimated Blood Loss: 800 mL    Urine Voided: * No values recorded between 5/11/2022  9:56 AM and 5/11/2022  1:22 PM *    Specimens:                None          Drains: * No LDAs found *    Findings: Nonunion right femoral neck fracture with retained hardware        Complications: None    Assistant: Yusuf Rubalcava PA; Fiona Devries  was responsible for performing the following activities: Retraction, Suction, Irrigation and Placing Dressing and their skilled assistance was necessary for the success of this case.    Peterson Michelle MD     Date: 5/11/2022  Time: 13:24 EDT

## 2022-05-11 NOTE — PLAN OF CARE
Goal Outcome Evaluation:  Plan of Care Reviewed With: (P) patient, spouse           Outcome Evaluation: (P) Pt has functional deficits with transfers and ambulation.  Skilled therapy is required to address deficits. Recommend return home with outpatient therapy services upon d/c.     Patient evaluation and/or treatment was performed under the direct supervision of a licensed physical therapist. Liborio Batres, PT

## 2022-05-11 NOTE — ANESTHESIA POSTPROCEDURE EVALUATION
Patient: Socrates Owen Jr.    Procedure Summary     Date: 05/11/22 Room / Location: [facility] OR 03 / [facility] MAIN OR    Anesthesia Start: 0956 Anesthesia Stop: 1326    Procedures:       TOTAL HIP ARTHROPLASTY ANTERIOR AND REMOVAL OF HARDWARE (Right Hip)      HARDWARE REMOVAL RIGHT HIP (Right Hip) Diagnosis:       Closed comminuted fracture of right hip with nonunion, subsequent encounter      (Closed comminuted fracture of right hip with nonunion, subsequent encounter [S72.091K])    Surgeons: Peterson Michelle MD Provider: Marco Smith MD    Anesthesia Type: general ASA Status: 2          Anesthesia Type: general    Vitals  Vitals Value Taken Time   /76 05/11/22 1410   Temp 36.1 °C (96.9 °F) 05/11/22 1405   Pulse 65 05/11/22 1410   Resp 14 05/11/22 1410   SpO2 91 % 05/11/22 1410           Post Anesthesia Care and Evaluation    Patient location during evaluation: bedside  Patient participation: complete - patient participated  Level of consciousness: awake and alert  Pain management: adequate  Airway patency: patent  Anesthetic complications: No anesthetic complications  PONV Status: none  Cardiovascular status: acceptable  Respiratory status: acceptable  Hydration status: acceptable    Comments: An Anesthesiologist personally participated in the most demanding procedures (including induction and emergence if applicable) in the anesthesia plan, monitored the course of anesthesia administration at frequent intervals and remained physically present and available for immediate diagnosis and treatment of emergencies.

## 2022-05-11 NOTE — NURSING NOTE
Patient complained of numbness/tingling in first 3 finger tips of left hand. Denies any vision changes. No trouble speaking, no one-sided weakness or paralysis. No sign of stroke present. Patient states he believes it's related to pulse ox or postioning from sleeping. Replacing and changing position of pulse ox with next vital signs.

## 2022-05-12 VITALS
BODY MASS INDEX: 33.08 KG/M2 | DIASTOLIC BLOOD PRESSURE: 72 MMHG | RESPIRATION RATE: 18 BRPM | WEIGHT: 231.04 LBS | HEART RATE: 80 BPM | TEMPERATURE: 98.3 F | SYSTOLIC BLOOD PRESSURE: 136 MMHG | HEIGHT: 70 IN | OXYGEN SATURATION: 100 %

## 2022-05-12 LAB
ANION GAP SERPL CALCULATED.3IONS-SCNC: 11.4 MMOL/L (ref 5–15)
BUN SERPL-MCNC: 14 MG/DL (ref 6–20)
BUN/CREAT SERPL: 14.9 (ref 7–25)
CALCIUM SPEC-SCNC: 8.9 MG/DL (ref 8.6–10.5)
CHLORIDE SERPL-SCNC: 104 MMOL/L (ref 98–107)
CO2 SERPL-SCNC: 21.6 MMOL/L (ref 22–29)
CREAT SERPL-MCNC: 0.94 MG/DL (ref 0.76–1.27)
EGFRCR SERPLBLD CKD-EPI 2021: 102.5 ML/MIN/1.73
GLUCOSE SERPL-MCNC: 236 MG/DL (ref 65–99)
HCT VFR BLD AUTO: 31.4 % (ref 37.5–51)
HGB BLD-MCNC: 10.2 G/DL (ref 13–17.7)
POTASSIUM SERPL-SCNC: 4.6 MMOL/L (ref 3.5–5.2)
SODIUM SERPL-SCNC: 137 MMOL/L (ref 136–145)

## 2022-05-12 PROCEDURE — 85014 HEMATOCRIT: CPT | Performed by: ORTHOPAEDIC SURGERY

## 2022-05-12 PROCEDURE — 97535 SELF CARE MNGMENT TRAINING: CPT

## 2022-05-12 PROCEDURE — 80048 BASIC METABOLIC PNL TOTAL CA: CPT | Performed by: ORTHOPAEDIC SURGERY

## 2022-05-12 PROCEDURE — 25010000002 KETOROLAC TROMETHAMINE PER 15 MG: Performed by: ORTHOPAEDIC SURGERY

## 2022-05-12 PROCEDURE — 97530 THERAPEUTIC ACTIVITIES: CPT

## 2022-05-12 PROCEDURE — 25010000002 MORPHINE PER 10 MG: Performed by: ORTHOPAEDIC SURGERY

## 2022-05-12 PROCEDURE — 85018 HEMOGLOBIN: CPT | Performed by: ORTHOPAEDIC SURGERY

## 2022-05-12 PROCEDURE — 97165 OT EVAL LOW COMPLEX 30 MIN: CPT

## 2022-05-12 PROCEDURE — 94799 UNLISTED PULMONARY SVC/PX: CPT

## 2022-05-12 RX ORDER — CLINDAMYCIN HYDROCHLORIDE 300 MG/1
300 CAPSULE ORAL 3 TIMES DAILY
Qty: 21 CAPSULE | Refills: 0 | Status: SHIPPED | OUTPATIENT
Start: 2022-05-12

## 2022-05-12 RX ORDER — OXYCODONE AND ACETAMINOPHEN 7.5; 325 MG/1; MG/1
1-2 TABLET ORAL EVERY 4 HOURS PRN
Qty: 40 TABLET | Refills: 0 | Status: SHIPPED | OUTPATIENT
Start: 2022-05-12 | End: 2022-05-17 | Stop reason: SDUPTHER

## 2022-05-12 RX ORDER — GLIPIZIDE 2.5 MG/1
5 TABLET, EXTENDED RELEASE ORAL DAILY
Qty: 60 TABLET | Refills: 0 | Status: SHIPPED | OUTPATIENT
Start: 2022-05-12 | End: 2022-06-11

## 2022-05-12 RX ORDER — METFORMIN HYDROCHLORIDE 500 MG/1
500 TABLET, EXTENDED RELEASE ORAL
Qty: 30 TABLET | Refills: 0 | Status: SHIPPED | OUTPATIENT
Start: 2022-05-12 | End: 2022-06-11

## 2022-05-12 RX ADMIN — KETOROLAC TROMETHAMINE 15 MG: 15 INJECTION, SOLUTION INTRAMUSCULAR; INTRAVENOUS at 04:20

## 2022-05-12 RX ADMIN — KETOROLAC TROMETHAMINE 15 MG: 15 INJECTION, SOLUTION INTRAMUSCULAR; INTRAVENOUS at 08:17

## 2022-05-12 RX ADMIN — OXYCODONE HYDROCHLORIDE AND ACETAMINOPHEN 1 TABLET: 7.5; 325 TABLET ORAL at 06:38

## 2022-05-12 RX ADMIN — Medication 3 ML: at 08:17

## 2022-05-12 RX ADMIN — OXYCODONE HYDROCHLORIDE AND ACETAMINOPHEN 1 TABLET: 7.5; 325 TABLET ORAL at 01:04

## 2022-05-12 RX ADMIN — ACETAMINOPHEN 1000 MG: 500 TABLET ORAL at 06:35

## 2022-05-12 RX ADMIN — MORPHINE SULFATE 6 MG: 4 INJECTION INTRAVENOUS at 11:21

## 2022-05-12 RX ADMIN — APIXABAN 2.5 MG: 2.5 TABLET, FILM COATED ORAL at 08:17

## 2022-05-12 RX ADMIN — METOPROLOL SUCCINATE 25 MG: 25 TABLET, EXTENDED RELEASE ORAL at 06:35

## 2022-05-12 RX ADMIN — FAMOTIDINE 40 MG: 20 TABLET ORAL at 08:17

## 2022-05-12 NOTE — SIGNIFICANT NOTE
05/12/22 1051   Plan   Final Discharge Disposition Code 01 - home or self-care   Final Note PTA in [place]. Appt: 5/13/22 at 3pm

## 2022-05-12 NOTE — THERAPY EVALUATION
Patient Name: Socrates Owen Jr.  : 1975    MRN: [id number]                              Today's Date: 2022       Admit Date: 2022    Visit Dx:     ICD-10-CM ICD-9-CM   1. Difficulty walking  R26.2 719.7   2. Closed comminuted fracture of right hip with nonunion, subsequent encounter  S72.091K 733.82   3. Decreased activities of daily living (ADL)  Z78.9 V49.89     Patient Active Problem List   Diagnosis   • Comminuted fracture of right hip (HCC)   • Right Femoral Neck Open Reduction Internal Fixation    • Closed comminuted fracture of hip with nonunion   • Arthritis of right hip   • Numbness of fingers   • Hypertension     Past Medical History:   Diagnosis Date   • Arthritis    • Elevated cholesterol    • Femur fracture, right (HCC)     2021 ORIF   • Hypertension     ON MEDS   • Kidney stone      Past Surgical History:   Procedure Laterality Date   • HIP OPEN REDUCTION Right 2021    Procedure: FEMORAL NECK OPEN REDUCTION INTERNAL FIXATION;  Surgeon: Pteerson Michelle MD;  Location: [facility];  Service: Orthopedics;  Laterality: Right;   • KNEE SURGERY Right     MENISCUS TEAR 2018      General Information     Row Name 22 1336 22 1316       OT Time and Intention    Document Type therapy note (daily note)  -LF evaluation  -LF    Mode of Treatment individual therapy;occupational therapy  -LF individual therapy;occupational therapy  -    Row Name 22 1316          General Information    Patient Profile Reviewed yes  -LF     Prior Level of Function --  Independent with ADLs, ambulated without a device but has a RW or STC from prior sx, has a step over tub where he stands to shower, standard commode, stands to groom, drives, no home O2, and 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  -LF     Existing Precautions/Restrictions no known precautions/restrictions  -LF     Barriers to Rehab none identified  -     Row Name 22 1316          Occupational  Profile    Reason for Services/Referral (Occupational Profile) Patient is currently status post right total hip replacement on May 11th, 2022. Occupational therapy consulted due to recent decline in ADLs/functional transfers. No previous occupational therapy services for current condition.  -     Row Name 05/12/22 1316          Living Environment    People in Home friend(s)  -     Row Name 05/12/22 1316          Home Main Entrance    Number of Stairs, Main Entrance two  -     Stair Railings, Main Entrance none  -     Row Name 05/12/22 1316          Cognition    Orientation Status (Cognition) oriented x 3  -     Row Name 05/12/22 1316          Safety Issues, Functional Mobility    Impairments Affecting Function (Mobility) balance  -           User Key  (r) = Recorded By, (t) = Taken By, (c) = Cosigned By    Initials Name Provider Type    Halima Duarte OT Occupational Therapist                 Mobility/ADL's     Row Name 05/12/22 1336 05/12/22 1322       Bed Mobility    Comment, (Bed Mobility) Patient upright and seated in recliner upon therapist's arrival.  - Patient upright and seated in recliner upon therapist's arrival.  -    Row Name 05/12/22 1336 05/12/22 1322       Transfers    Transfers sit-stand transfer;stand-sit transfer  -LF sit-stand transfer;stand-sit transfer  -LF    Sit-Stand [place] (Transfers) contact guard;verbal cues  -LF contact guard;verbal cues  -LF    Stand-Sit [place] (Transfers) contact guard;verbal cues  -LF contact guard;verbal cues  -LF    Row Name 05/12/22 1336 05/12/22 1322       Sit-Stand Transfer    Assistive Device (Sit-Stand Transfers) walker, front-wheeled  -LF walker, front-wheeled  -LF    Row Name 05/12/22 1336 05/12/22 1322       Stand-Sit Transfer    Assistive Device (Stand-Sit Transfers) walker, front-wheeled  -LF walker, front-wheeled  -LF    Row Name 05/12/22 1336 05/12/22 1322       Functional Mobility    Functional Mobility- Ind. Level  contact guard assist  -LF contact guard assist  -LF    Functional Mobility-Distance (Feet) 20  -LF 20  -LF    Functional Mobility- Comment Patient ambulated to/from the sink with CGA using RW in preparation for grooming.  -LF Patient ambulated to/from the sink with CGA using RW in preparation for grooming.  -    Row Name 05/12/22 1336 05/12/22 1322       Activities of Daily Living    BADL Assessment/Intervention upper body dressing;lower body dressing  -LF bathing;upper body dressing;lower body dressing;grooming;feeding;toileting  -    Row Name 05/12/22 1336          Mobility    Extremity Weight-bearing Status right lower extremity  -LF     Right Lower Extremity (Weight-bearing Status) weight-bearing as tolerated (WBAT)  -     Row Name 05/12/22 1322          Bathing Assessment/Intervention    [place] Level (Bathing) bathing skills;upper body;set up;lower body;contact guard assist  -     Row Name 05/12/22 1336 05/12/22 1322       Upper Body Dressing Assessment/Training    [place] Level (Upper Body Dressing) upper body dressing skills;don;pull-over garment;set up  -LF upper body dressing skills;set up  -LF    Position (Upper Body Dressing) unsupported sitting  -LF --    Row Name 05/12/22 1336 05/12/22 1322       Lower Body Dressing Assessment/Training    [place] Level (Lower Body Dressing) lower body dressing skills;don;pants/bottoms;socks;contact guard assist  -LF lower body dressing skills;contact guard assist  -LF    Position (Lower Body Dressing) unsupported sitting;supported standing  -LF --    Comment, (Lower Body Dressing) Educated patient on lower body adaptive dressing technique, verified learning via teachback.  -LF --    Row Name 05/12/22 1336 05/12/22 1322       Grooming Assessment/Training    [place] Level (Grooming) grooming skills;hair care, combing/brushing;oral care regimen;wash face, hands;set up  -LF grooming skills;set up  -LF    Position (Grooming) unsupported  sitting;sink side  -LF --    Row Name 05/12/22 1322          Self-Feeding Assessment/Training    [place] Level (Feeding) feeding skills;set up  -[facility] Name 05/12/22 1322          Toileting Assessment/Training    [place] Level (Toileting) toileting skills;contact guard assist  -LF           User Key  (r) = Recorded By, (t) = Taken By, (c) = Cosigned By    Initials Name Provider Type     Halima Horne OT Occupational Therapist               Obj/Interventions     Row Name 05/12/22 1328          Sensory Assessment (Somatosensory)    Sensory Assessment (Somatosensory) UE sensation intact  -[facility] Name 05/12/22 1328          Vision Assessment/Intervention    Visual Impairment/Limitations WFL;corrective lenses full-time  -[facility] Name 05/12/22 1328          Range of Motion Comprehensive    General Range of Motion bilateral upper extremity ROM WFL  -LF     Row Name 05/12/22 1328          Strength Comprehensive (MMT)    Comment, General Manual Muscle Testing (MMT) Assessment 5/5 bilateral upper extremities  -[facility] Name 05/12/22 1338 05/12/22 1328       Motor Skills    Motor Skills functional endurance  -LF coordination;functional endurance  -LF    Coordination -- WFL  -LF    Functional Endurance Good for BADLs  -LF Good for BADLs  -LF    [facility] Name 05/12/22 1338 05/12/22 1328       Balance    Balance Assessment sitting dynamic balance;standing dynamic balance  -LF sitting dynamic balance;standing dynamic balance  -LF    Dynamic Sitting Balance independent  -LF independent  -LF    Position, Sitting Balance unsupported;sitting in chair  -LF unsupported;sitting in chair  -LF    Dynamic Standing Balance contact guard  -LF contact guard  -LF    Position/Device Used, Standing Balance supported;walker, front-wheeled  -LF supported;walker, front-wheeled  -LF    Balance Interventions sitting;standing;sit to stand;supported;dynamic;occupation based/functional task;weight shifting activity  -LF --           User Key  (r) = Recorded By, (t) = Taken By, (c) = Cosigned By    Initials Name Provider Type    LF Halima Horne, OT Occupational Therapist               Goals/Plan     Row Name 05/12/22 1333          Bed Mobility Goal 1 (OT)    Activity/Assistive Device (Bed Mobility Goal 1, OT) bed mobility activities, all  -LF     [place] Level/Cues Needed (Bed Mobility Goal 1, OT) modified independence  -LF     Time Frame (Bed Mobility Goal 1, OT) long term goal (LTG);10 days  -     Row Name 05/12/22 1333          Transfer Goal 1 (OT)    Activity/Assistive Device (Transfer Goal 1, OT) transfers, all;walker, rolling  -LF     [place] Level/Cues Needed (Transfer Goal 1, OT) modified independence  -LF     Time Frame (Transfer Goal 1, OT) long term goal (LTG);10 days  -     Row Name 05/12/22 1333          Bathing Goal 1 (OT)    Activity/Device (Bathing Goal 1, OT) bathing skills, all;lower body bathing  -LF     [place] Level/Cues Needed (Bathing Goal 1, OT) modified independence  -LF     Time Frame (Bathing Goal 1, OT) long term goal (LTG);10 days  -     Row Name 05/12/22 1333          Dressing Goal 1 (OT)    Activity/Device (Dressing Goal 1, OT) dressing skills, all;lower body dressing  -LF     [place]/Cues Needed (Dressing Goal 1, OT) modified independence  -LF     Time Frame (Dressing Goal 1, OT) long term goal (LTG);10 days  -     Row Name 05/12/22 1333          Toileting Goal 1 (OT)    Activity/Device (Toileting Goal 1, OT) toileting skills, all  -LF     [place] Level/Cues Needed (Toileting Goal 1, OT) modified independence  -LF     Time Frame (Toileting Goal 1, OT) long term goal (LTG);10 days  -     Row Name 05/12/22 1333          Therapy Assessment/Plan (OT)    Planned Therapy Interventions (OT) activity tolerance training;patient/caregiver education/training;BADL retraining;functional balance retraining;occupation/activity based interventions;transfer/mobility retraining  -            User Key  (r) = Recorded By, (t) = Taken By, (c) = Cosigned By    Initials Name Provider Type     Halima Horne OT Occupational Therapist               Clinical Impression     Row Name 05/12/22 1328          Pain Assessment    Additional Documentation Pain Scale: FACES Pre/Post-Treatment (Group)  -LF     Row Name 05/12/22 1328          Pain Scale: FACES Pre/Post-Treatment    Pain: FACES Scale, Pretreatment 2-->hurts little bit  -LF     Posttreatment Pain Rating 2-->hurts little bit  -LF     Pain Location - Side/Orientation Right  -     Pain Location - hip  -LF     Row Name 05/12/22 1328          Plan of Care Review    Plan of Care Reviewed With patient  -     Progress no change  -     Outcome Evaluation Patient presents with limitations in self-care, functional transfers, and balance. He would benefit from continued skilled occupational therapy services to maximize ADL performance and return home safely and independently.  -     Row Name 05/12/22 1328          Therapy Assessment/Plan (OT)    Patient/Family Therapy Goal Statement (OT) To walk without pain.  -     Rehab Potential (OT) good, to achieve stated therapy goals  -     Criteria for Skilled Therapeutic Interventions Met (OT) yes;meets criteria;skilled treatment is necessary  -     Therapy Frequency (OT) 5 times/wk  -     Row Name 05/12/22 1328          Therapy Plan Review/Discharge Plan (OT)    Anticipated Discharge Disposition (OT) home with outpatient therapy services;home with assist  -     Row Name 05/12/22 1328          Vital Signs    O2 Delivery Pre Treatment room air  -LF     O2 Delivery Intra Treatment room air  -LF     O2 Delivery Post Treatment room air  -LF           User Key  (r) = Recorded By, (t) = Taken By, (c) = Cosigned By    Initials Name Provider Type     Halima Horne OT Occupational Therapist               Outcome Measures     Row Name 05/12/22 1337          How much help from another is currently  needed...    Putting on and taking off regular lower body clothing? 3  -LF     Bathing (including washing, rinsing, and drying) 3  -LF     Toileting (which includes using toilet bed pan or urinal) 3  -LF     Putting on and taking off regular upper body clothing 4  -LF     Taking care of personal grooming (such as brushing teeth) 4  -LF     Eating meals 4  -LF     AM-PAC 6 Clicks Score (OT) 21  -LF     Row Name 05/12/22 1132 05/12/22 0800       How much help from another person do you currently need...    Turning from your back to your side while in flat bed without using bedrails? 4 (P)   - 4  -TC    Moving from lying on back to sitting on the side of a flat bed without bedrails? 4 (P)   - 4  -TC    Moving to and from a bed to a chair (including a wheelchair)? 4 (P)   - 4  -TC    Standing up from a chair using your arms (e.g., wheelchair, bedside chair)? 4 (P)   - 4  -TC    Climbing 3-5 steps with a railing? 4 (P)   - 3  -TC    To walk in hospital room? 4 (P)   -SH 4  -TC    AM-PAC 6 Clicks Score (PT) 24 (P)   - 23  -TC    Highest level of mobility 8 --> Walked 250 feet or more (P)   - 7 --> Walked 25 feet or more  -    Row Name 05/12/22 1334 05/12/22 1132       Functional Assessment    Outcome Measure Options AM-PAC 6 Clicks Daily Activity (OT);Optimal Instrument  -LF AM-PAC 6 Clicks Basic Mobility (PT) (P)   -    Row Name 05/12/22 1334          Optimal Instrument    Optimal Instrument Optimal - 3  -LF     Bending/Stooping 2  -LF     Standing 2  -LF     Reaching 1  -LF     From the list, choose the 3 activities you would most like to be able to do without any difficulty Bending/stooping;Standing;Reaching  -LF     Total Score Optimal - 3 5  -LF           User Key  (r) = Recorded By, (t) = Taken By, (c) = Cosigned By    Initials Name Provider Type    LF Halima Horne, OT Occupational Therapist    Belkys De Jesus, RN Registered Nurse     Rod Gracia, PT Student PT Student                 Occupational Therapy Education                 Title: PT OT SLP Therapies (In Progress)     Topic: Occupational Therapy (In Progress)     Point: ADL training (Done)     Description:   Instruct learner(s) on proper safety adaptation and remediation techniques during self care or transfers.   Instruct in proper use of assistive devices.              Learning Progress Summary           Patient Acceptance, E,TB, VU by  at 5/12/2022 1335                   Point: Home exercise program (Not Started)     Description:   Instruct learner(s) on appropriate technique for monitoring, assisting and/or progressing therapeutic exercises/activities.              Learner Progress:  Not documented in this visit.          Point: Precautions (Done)     Description:   Instruct learner(s) on prescribed precautions during self-care and functional transfers.              Learning Progress Summary           Patient Acceptance, E,TB, VU by  at 5/12/2022 1335                   Point: Body mechanics (Done)     Description:   Instruct learner(s) on proper positioning and spine alignment during self-care, functional mobility activities and/or exercises.              Learning Progress Summary           Patient Acceptance, E,TB, VU by  at 5/12/2022 1335                               User Key     Initials Effective Dates Name Provider Type Discipline     06/16/21 -  Halima Horne OT Occupational Therapist OT              OT Recommendation and Plan  Planned Therapy Interventions (OT): activity tolerance training, patient/caregiver education/training, BADL retraining, functional balance retraining, occupation/activity based interventions, transfer/mobility retraining  Therapy Frequency (OT): 5 times/wk  Plan of Care Review  Plan of Care Reviewed With: patient  Progress: no change  Outcome Evaluation: Patient presents with limitations in self-care, functional transfers, and balance. He would benefit from continued skilled occupational  therapy services to maximize ADL performance and return home safely and independently.     Time Calculation:    Time Calculation- OT     Row Name 05/12/22 1335 05/12/22 1126          Time Calculation- OT    OT Received On 05/12/22  -LF --     OT Goal Re-Cert Due Date 05/21/22  -LF --            Timed Charges    29743 - Gait Training Minutes  -- 6 (P)   -     53711 - OT Therapeutic Activity Minutes 12  -LF --     88415 - OT Self Care/Mgmt Minutes 12  -LF --            Untimed Charges    OT Eval/Re-eval Minutes 20  -LF --            Total Minutes    Timed Charges Total Minutes 24  -LF 6 (P)   -SH     Untimed Charges Total Minutes 20  -LF --      Total Minutes 44  -LF 6 (P)   -SH           User Key  (r) = Recorded By, (t) = Taken By, (c) = Cosigned By    Initials Name Provider Type    LF Halima Horne OT Occupational Therapist     Rod Gracia, PT Student PT Student              Therapy Charges for Today     Code Description Service Date Service Provider Modifiers Qty    54575358953 HC OT THERAPEUTIC ACT EA 15 MIN 5/12/2022 Halima Horne OT GO 1    75178824796 HC OT SELF CARE/MGMT/TRAIN EA 15 MIN 5/12/2022 Halima Horne OT GO 1    65426297036 HC OT EVAL LOW COMPLEXITY 2 5/12/2022 Halima Horne OT GO 1               Halima Horne OT  5/12/2022

## 2022-05-12 NOTE — PLAN OF CARE
Goal Outcome Evaluation:  Plan of Care Reviewed With: patient        Progress: improving  Outcome Evaluation: Patient to discharge after morning therapy. patient alert, oriented, voice needs, tolerated AM medication, discharge packet will be provided with follow up appointment. [place] to transport in personal vehicle at discharge.

## 2022-05-12 NOTE — PROGRESS NOTES
[facility]     Progress Note    Patient Name: Socrates Owen Jr.  : 1975  MRN: [id number]  Primary Care Physician:  Antonino Nicole MD  Date of admission: 2022    Subjective   Subjective     HPI:  Patient Reports overall doing well today.  Still having some pain in the hip.  He is sitting in a chair.  He has been up walking with a walker.  No new complaints.    Review of Systems   See HPI    Objective   Objective     Vitals:   Temp:  [96.9 °F (36.1 °C)-99.4 °F (37.4 °C)] 98 °F (36.7 °C)  Heart Rate:  [60-88] 88  Resp:  [12-18] 18  BP: ()/(55-99) 130/72  Flow (L/min):  [2] 2  FiO2 (%):  [0.6 %-100 %] 100 %  Physical Exam    General: Alert, no acute distress   Chest: Unlabored breathing, cardiovascular: Regular heart rate   Musculoskeletal: Dressings intact.  No significant drainage.  Positive pulses.  Negative [place].  Range of motion of the hip intact    Result Review      Hemoglobin   Date Value Ref Range Status   2022 10.2 (L) 13.0 - 17.7 g/dL Final     Hematocrit   Date Value Ref Range Status   2022 31.4 (L) 37.5 - 51.0 % Final        Result Review:  I have personally reviewed the results from the time of this admission to 2022 08:04 EDT and agree with these findings:  [x]  Laboratory  []  Microbiology  [x]  Radiology  []  EKG/Telemetry   []  Cardiology/Vascular   []  Pathology  []  Old records  []  Other:      Assessment & Plan   Assessment / Plan     Brief Patient Summary:  Socrates Owen Jr. is a 46 y.o. male who is status post removal of hardware and right total hip arthroplasty postoperative day #1    Active Hospital Problems:  Active Hospital Problems    Diagnosis    • **Closed comminuted fracture of hip with nonunion    • Numbness of fingers    • Hypertension      ON MEDS     • Arthritis of right hip      Plan: Weightbearing as tolerated with walker  Physical and Occupational Therapy  Pain control  DVT prophylaxis  Plan for discharge home later today if medically able        DVT prophylaxis:  Medical and mechanical DVT prophylaxis orders are present.    CODE STATUS:      Disposition:  I expect patient to be discharged later today if medically able.    Electronically signed by Peterson Michelle MD, 05/12/22, 8:04 AM EDT.

## 2022-05-12 NOTE — THERAPY TREATMENT NOTE
Acute Care - Physical Therapy Treatment Note and Discharge Summary   [place]     Patient Name: Socrates Owen Jr.  : 1975  MRN: [id number]  Today's Date: 2022      Visit Dx:     ICD-10-CM ICD-9-CM   1. Difficulty walking  R26.2 719.7   2. Closed comminuted fracture of right hip with nonunion, subsequent encounter  S72.091K 739.82     Patient Active Problem List   Diagnosis   • Comminuted fracture of right hip (HCC)   • Right Femoral Neck Open Reduction Internal Fixation    • Closed comminuted fracture of hip with nonunion   • Arthritis of right hip   • Numbness of fingers   • Hypertension     Past Medical History:   Diagnosis Date   • Arthritis    • Elevated cholesterol    • Femur fracture, right (HCC)     2021 ORIF   • Hypertension     ON MEDS   • Kidney stone      Past Surgical History:   Procedure Laterality Date   • HIP OPEN REDUCTION Right 2021    Procedure: FEMORAL NECK OPEN REDUCTION INTERNAL FIXATION;  Surgeon: Peterson Michelle MD;  Location: [facility];  Service: Orthopedics;  Laterality: Right;   • KNEE SURGERY Right     MENISCUS TEAR 2018     PT Assessment (last 12 hours)     PT Evaluation and Treatment     Row Name 22 1126          Physical Therapy Time and Intention    Subjective Information no complaints (P)   -SH     Document Type therapy note (daily note) (P)   -SH     Mode of Treatment individual therapy;physical therapy (P)   -SH     Patient Effort excellent (P)   -SH     Symptoms Noted During/After Treatment none (P)   -SH     Row Name 22 1126          General Information    Patient Profile Reviewed yes (P)   -SH     Patient Observations alert;agree to therapy;cooperative (P)   -SH     Barriers to Rehab none identified (P)   -SH     Row Name 22 1126          Cognition    Orientation Status (Cognition) oriented x 4 (P)   -SH     Row Name 22 1126          Transfers    Sit-Stand [place] (Transfers) standby assist (P)   -     Row Name  05/12/22 1126          Sit-Stand Transfer    Assistive Device (Sit-Stand Transfers) walker, front-wheeled (P)   -     Row Name 05/12/22 1126          Gait/Stairs (Locomotion)    Gait/Stairs Locomotion gait/ambulation assistive device (P)   -     [place] Level (Gait) supervision (P)   -     Assistive Device (Gait) walker, front-wheeled (P)   -     Distance in Feet (Gait) 200 (P)   -     Pattern (Gait) step-through (P)   -     Negotiation (Stairs) stairs independence (P)   -     [place] Level (Stairs) contact guard;verbal cues (P)   -     Handrail Location (Stairs) both sides (P)   -     Number of Steps (Stairs) 28 (P)   -     Ascending Technique (Stairs) step-to-step;other (see comments) (P)   All with RLE first  -     Descending Technique (Stairs) step-to-step;other (see comments) (P)   All with LLE first.  -     Stairs, Safety Issues other (see comments) (P)   Needs handrails to ascend, does not have any at home.  -     Stairs, Impairments -- (P)   -     Stairs Assessment/Intervention Pt challenged himself by forcing his RLE to work on every step.  After two flights of stairs he felt like he worked hard enough. (P)   -     Row Name 05/12/22 1126          Safety Issues, Functional Mobility    Impairments Affecting Function (Mobility) strength (P)   -     Row Name 05/12/22 1126          Balance    Balance Interventions standing;dynamic;weight shifting activity (P)   -     Row Name             Wound 05/11/22 0924 lower lip Other (comment)    Wound - Properties Group Placement Date: 05/11/22  -ROGERIO Placement Time: 0924  -ROGERIO Present on Hospital Admission: Y  -ROGERIO Orientation: lower  -ROGERIO Location: lip  -ROGERIO Primary Wound Type: Other  -ROGERIO, SCAB      Retired Wound - Properties Group Placement Date: 05/11/22  -ROGERIO Placement Time: 0924  -ROGERIO Present on Hospital Admission: Y  -ROGERIO Orientation: lower  -ROGERIO Location: lip  -ROGERIO Primary Wound Type: Other  -ROGERIO, SCAB      Retired Wound -  Properties Group Date first assessed: 05/11/22  -ROGERIO Time first assessed: 0924 -ROGERIO Present on Hospital Admission: Y  -ROGERIO Location: lip  -ROGERIO Primary Wound Type: Other  -ROGERIO, SCAB      Row Name             Wound 05/11/22 0925 Right lower leg Abrasion    Wound - Properties Group Placement Date: 05/11/22  -ROGERIO Placement Time: 0925  -ROGERIO Present on Hospital Admission: Y  -ROGERIO Side: Right  -ROGERIO Orientation: lower  -ROGERIO Location: leg  -ROGERIO Primary Wound Type: Abrasion  -ROGERIO     Retired Wound - Properties Group Placement Date: 05/11/22  -ROGERIO Placement Time: 0925 -ROGERIO Present on Hospital Admission: Y  -ROGERIO Side: Right  -ROGERIO Orientation: lower  -ROGERIO Location: leg  -ROGERIO Primary Wound Type: Abrasion  -ROGERIO     Retired Wound - Properties Group Date first assessed: 05/11/22  -ROGERIO Time first assessed: 0925 -ROGERIO Present on Hospital Admission: Y  -ROGERIO Side: Right  -ROGERIO Location: leg  -ROGERIO Primary Wound Type: Abrasion  -ROGERIO     Row Name             Wound 05/11/22 1042 Right anterior hip Incision    Wound - Properties Group Placement Date: 05/11/22  -WM Placement Time: 1042  -WM Present on Hospital Admission: N  -WM Side: Right  -WM Orientation: anterior  -WM Location: hip  -WM Primary Wound Type: Incision  -WM     Retired Wound - Properties Group Placement Date: 05/11/22  -WM Placement Time: 1042  -WM Present on Hospital Admission: N  -WM Side: Right  -WM Orientation: anterior  -WM Location: hip  -WM Primary Wound Type: Incision  -WM     Retired Wound - Properties Group Date first assessed: 05/11/22  -WM Time first assessed: 1042  -WM Present on Hospital Admission: N  -WM Side: Right  -WM Location: hip  -WM Primary Wound Type: Incision  -WM     Row Name 05/12/22 1126          Positioning and Restraints    Pre-Treatment Position sitting in chair/recliner (P)   -SH     Post Treatment Position chair (P)   -     Row Name 05/12/22 1126          Progress Summary (PT)    Reason for Discharge (PT) patient met all goals and outcomes (P)   -SH     Row Name  05/12/22 1126          Transfer Goal 1 (PT)    Activity/Assistive Device (Transfer Goal 1, PT) transfers, all (P)   -     [place] Level/Cues Needed (Transfer Goal 1, PT) independent (P)   -SH     Time Frame (Transfer Goal 1, PT) long term goal (LTG);10 days (P)   -     Progress/Outcome (Transfer Goal 1, PT) goal met (P)   -     Row Name 05/12/22 1126          Gait Training Goal 1 (PT)    Activity/Assistive Device (Gait Training Goal 1, PT) gait (walking locomotion);assistive device use;walker, rolling (P)   -     [place] Level (Gait Training Goal 1, PT) independent (P)   -     Distance (Gait Training Goal 1, PT) 200 (P)   -     Time Frame (Gait Training Goal 1, PT) long term goal (LTG);10 days (P)   -     Progress/Outcome (Gait Training Goal 1, PT) goal met (P)   -     Row Name 05/12/22 1126          Discharge Summary (PT)    Additional Documentation Discharge Summary (PT) (Group) (P)   -     Row Name 05/12/22 1126          Discharge Summary (PT)    Outcomes Achieved/Progress Made Upon Discharge (PT) all goals met within established timeframes (P)   -     Transfer to Another Level of Care or Facility (PT) home with outpatient therapy services recommended (P)   -     Discharge Summary Statement (PT) Pt has completed all functional goals with therapy and no longer requires inpatient therapy services.  Recommend return home with follow up at outpatient therapy. He should continue ambulation throughout the day to retain his current strength. (P)   -           User Key  (r) = Recorded By, (t) = Taken By, (c) = Cosigned By    Initials Name Provider Type    Farhat Chi, RN Registered Nurse    Kristen Nolasco RN Registered Nurse    SH Rod Gracia, PT Student PT Student                Physical Therapy Education                 Title: PT OT SLP Therapies (Done)     Topic: Physical Therapy (Done)     Point: Mobility training (Done)     Learning Progress Summary            Patient Acceptance, E,TB, VU by  at 5/11/2022 1457                   Point: Home exercise program (Done)     Learning Progress Summary           Patient Acceptance, E,TB, VU by  at 5/11/2022 1457                   Point: Body mechanics (Done)     Learning Progress Summary           Patient Acceptance, E,TB, VU by  at 5/11/2022 1457                   Point: Precautions (Done)     Learning Progress Summary           Patient Acceptance, E,TB, VU by  at 5/11/2022 1457                               User Key     Initials Effective Dates Name Provider Type Discipline     04/08/22 -  Rod Gracia, PT Student PT Student PT              PT Recommendation and Plan  Anticipated Discharge Disposition (PT): home with outpatient therapy services  Planned Therapy Interventions (PT): balance training, bed mobility training, gait training, home exercise program, patient/family education, ROM (range of motion), stair training, strengthening, transfer training  Therapy Frequency (PT): 2 times/day  Plan of Care Reviewed With: patient, spouse  Outcome Evaluation: Pt has functional deficits with transfers and ambulation.  Skilled therapy is required to address deficits. Recommend return home with outpatient therapy services upon d/c.   Outcome Measures     [facility] Name 05/12/22 1132 05/11/22 1456          How much help from another person do you currently need...    Turning from your back to your side while in flat bed without using bedrails? 4 (P)   -SH 4  -DELIO (r) SH (t) DELIO (c)     Moving from lying on back to sitting on the side of a flat bed without bedrails? 4 (P)   -SH 4  -DELIO (r) SH (t) DELIO (c)     Moving to and from a bed to a chair (including a wheelchair)? 4 (P)   -SH 3  -DELIO (r) SH (t) DELIO (c)     Standing up from a chair using your arms (e.g., wheelchair, bedside chair)? 4 (P)   -SH 3  -DELIO (r) SH (t) DELIO (c)     Climbing 3-5 steps with a railing? 4 (P)   -SH 3  -DELIO (r) SH (t) DELIO (c)     To walk in hospital room? 4  (P)   -SH 3  -DELIO (r) SH (t) DELIO (c)     AM-PAC 6 Clicks Score (PT) 24 (P)   -SH 20  -DELIO (r) SH (t)            Functional Assessment    Outcome Measure Options AM-PAC 6 Clicks Basic Mobility (PT) (P)   -SH AM-PAC 6 Clicks Basic Mobility (PT)  -DELIO (r) SH (t) DELIO (c)           User Key  (r) = Recorded By, (t) = Taken By, (c) = Cosigned By    Initials Name Provider Type    Liborio Palacios, PT Physical Therapist    Rod Pacheco, PT Student PT Student                 Time Calculation:    PT Charges     Row Name 05/12/22 1126             Time Calculation    PT Received On 05/12/22 (P)   -              Timed Charges    74462 - Gait Training Minutes  6 (P)   -SH      39251 - PT Therapeutic Activity Minutes 8 (P)   -              Total Minutes    Timed Charges Total Minutes 14 (P)   -       Total Minutes 14 (P)   -            User Key  (r) = Recorded By, (t) = Taken By, (c) = Cosigned By    Initials Name Provider Type     Rod Gracia, PT Student PT Student              Therapy Charges for Today     Code Description Service Date Service Provider Modifiers Qty    [id number] HC PT THERAPEUTIC ACT EA 15 MIN 5/12/2022 Rod Gracia, PT Student GP 1        Patient evaluation and/or treatment was performed under the direct supervision of a licensed physical therapist. Liborio Batres, PT      PT G-Codes  Outcome Measure Options: (P) AM-PAC 6 Clicks Basic Mobility (PT)  AM-PAC 6 Clicks Score (PT): (P) 24    Rod Gracia, PT Student  5/12/2022

## 2022-05-12 NOTE — PLAN OF CARE
Goal Outcome Evaluation:  Plan of Care Reviewed With: patient        Progress: no change  Outcome Evaluation: Patient presents with limitations in self-care, functional transfers, and balance. He would benefit from continued skilled occupational therapy services to maximize ADL performance and return home safely and independently.

## 2022-05-12 NOTE — OP NOTE
TOTAL HIP ARTHROPLASTY ANTERIOR, HARDWARE REMOVAL  Procedure Report    Patient Name:  Socrates Owen Jr.  YOB: 1975    Date of Surgery:  5/11/2022     Indications: The patient is a 46-year-old male who previously sustained a right femoral neck fracture.  The patient was treated with internal fixation of the fracture.  The patient developed a delayed union of the fracture with persistent pain and disability.  The patient wishes to undergo removal of hardware and total hip replacement.  Risk and benefits of the surgery were discussed.  Informed consent was obtained and he wished to proceed.  Risk of surgery including bleeding, infection, damage to neurovascular structures, heart attack, stroke, DVT/PE, anesthesia complications including death, leg length discrepancy, periprosthetic fracture, deep infection, instability, among others.  Informed consent was obtained and he wished to proceed.    Pre-op Diagnosis:   Closed comminuted fracture of right hip with nonunion, subsequent encounter [S72.091K]       Post-Op Diagnosis Codes:     * Closed comminuted fracture of right hip with nonunion, subsequent encounter [S72.091K]    Procedure/CPT® Codes:      Procedure(s):  Right TOTAL HIP ARTHROPLASTY ANTERIOR AND REMOVAL OF HARDWARE  HARDWARE REMOVAL RIGHT HIP    Staff:  Surgeon(s):  Peterson Michelle MD    Assistant: Yusuf Rubalcava PA; Fiona Devries    Anesthesia: General    Estimated Blood Loss: 800 mL    Implants:    Implant Name Type Inv. Item Serial No.  Lot No. LRB No. Used Action   NAIL FEM CEPH CARROLL TI 21.5CM 10MM 125D LT - [id number] Implant NAIL IM/FEM CEPH CARROLL TI 21.5CM 10MM 125DEG LT  [employer] [id number] Right 1 Explanted   SCRW JOSE FA FUL/THRD HEX3.5 TI 5X32.5MM - [id number] Implant SCRW JOSE FA FUL/THRD HEX3.5 TI 5X32.5MM  [employer] [id number] Right 1 Explanted   SCRW LAG CEPH TI 10.4U018OH - [id number] Implant SCRW LAG CEPH TI 10.1Y793CR  [employer] [id number] Right  1 Explanted   LINER ACET G7 NTRL E1 CARLOS [employer]BERT[employer] 36MM - [id number] Implant LINER ACET G7 NTRL E1 CARLOS ALBERTO 36MM  [employer] [id number] Right 1 Implanted   SHLL ACET G7 PPS LTD/HL CARLOS ALBERTO 52MM - [id number] Implant SHLL ACET G7 PPS LTD/HL CARLOS ALBERTO 52MM  [employer] [id number] Right 1 Implanted   STEM FEM/HIP ARCOSSTS MOD DIST [id number] - [id number] Implant STEM FEM/HIP ARCOSSTS MOD DIST [id number]  [employer] [id number] Right 1 Implanted   CONE FEM BDY PROX STDOFFST JOANNE A 60 - [id number] Implant CONE FEM BDY PROX STDOFFST JOANNE A 60  [employer] [id number] Right 1 Implanted   HD FEM/HIP G7 BIOLOX/DELTA OPTN 36MM - [id number] Implant HD FEM/HIP G7 BIOLOX/DELTA OPTN 36MM  [employer] [id number] Right 1 Implanted   ADAPT HIP BIOLOX OPTN TYPE1 TPR STD - [id number] Implant ADAPT HIP BIOLOX OPTN TYPE1 TPR STD  [employer] [id number] Right 1 Implanted       Specimen:          None        Findings: Femoral neck fracture delayed union with retained hardware to the proximal femur    Complications: None    Description of Procedure: Operative site was marked in the preoperative holding area.  The patient was brought to the operating room and placed supine on the [place] operative table.  General anesthesia was given.  All bony prominences were padded. Both legs were placed into padded traction boots. A padded peroneal post was placed. Preoperative antibiotic and tranexamic acid was given. Formal time-out was held.    The previous incisions for insertion of cephalomedullary nail were marked out.  A proximal incision was made and the subcutaneous tissues and fascia were divided.  A rongeur was used to remove heterotopic bone at the entry point of the nail.  Fluoroscopy was used to localize the proximal tip of the nail.  The flexible screwdriver was then used to loosen the set screw.  The lateral thigh incision used for the lag screw insertion was then opened and the fascia was split.  The guidewire was placed through the lag screw and the screwdriver was then  used to remove the lag screw.  At this point the distal locking screw then was out in the distal interlocking screw was removed.  We then used the insertion bolt and screwed this into the top of the nail.  A vice  was attached to the bone and a hammers used to remove nail.  At this point the incisions were irrigated with Irrisept and saline.  They were closed with 0 Vicryl in the deep tissues, 2-0 Vicryl in the subcutaneous tissues and staples on the skin.  At this point attention was turned to the arthroplasty portion of the case.    Incision was made over the anterior hip just lateral and distal to the ASIS. The fascia was sharply divided and the TFL muscle was retracted laterally. The circumflex vessels were treated with the bipolar sealer. The capsule was incised in an inverted T capsulotomy and tagged with nonabsorbable suture. The femoral neck osteotomy was made and the femoral head was removed. The labrum was excised. Acetabular reaming was performed with fluoroscopic guidance. An appropriately sized acetabular component was inserted in the appropriate position.  The acetabular component had excellent fixation and supplemental screw fixation was not necessary.  The polyethylene liner was placed.  At this point the appropriate femoral releases were performed and the femur was exposed.  The modular reamer was inserted into the femoral canal.  It was initially tight on inserting a size 12 reamer so flexible reamers were used in sequential fashion to reamed to size 12.  We were then able to insert the size 12 reamer.  The modular stem was inserted into the femoral canal.  The reamer for the proximal body of the implant was attached and reaming was performed.  The trial implant was then attached to the stem and a trial head was placed.  The hip was reduced.  Stability was excellent with good stability with extension and external rotation.  Fluoroscopic images were taken of the hip and showed the trial implant  in good positions with near equal leg lengths.  The hip was dislocated, and the trials removed.  The proximal body implant was then impacted onto the stem and was attached with a screw.  The hip was trialed a final time, and the appropriate femoral head was inserted. The hip was reduced and final images were taken. The hip was irrigated with irrisept solution and bacitracin saline. Local anesthetic was injected around the capsule, and the capsule was closed with #1 vicryl. The fascia was closed with #1 vicryl and the subcutaneous tissues with 2-0 vicryl. The skin was closed with staples and an aquacel dressing was placed. The patient awoke form anesthesia in stable condition. All counts were correct. There were no complications.    Assistant: Yusuf Rubalcava PA; Fiona Devries  was responsible for performing the following activities: Retraction, Suction, Irrigation and Placing Dressing and their skilled assistance was necessary for the success of this case.    Peterson Michelle MD     Date: 5/11/2022  Time: 21:04 EDT

## 2022-05-12 NOTE — DISCHARGE SUMMARY
[facility]         DISCHARGE SUMMARY    Patient Name: Socrates Owen Jr.  : 1975  MRN: [id number]    Date of Admission: 2022  Date of Discharge: 2022  Primary Care Physician: Antonino Nicole MD    Consults     Date and Time Order Name Status Description    2022  2:19 PM Inpatient Hospitalist Consult            Presenting Problem:   Closed comminuted fracture of right hip with nonunion, subsequent encounter [S72.091K]  Arthritis of right hip [M16.11]    Active and Resolved Hospital Problems:  Active Hospital Problems    Diagnosis POA   • **Closed comminuted fracture of hip with nonunion [S72.099K] Unknown   • Numbness of fingers [R20.0] No   • Hypertension [I10] Yes   • Arthritis of right hip [M16.11] Yes      Resolved Hospital Problems   No resolved problems to display.     New onset type 2 diabetes    Hospital Course     Hospital Course:  Socrates Owen Jr. is a 46 y.o. male admitted electively for right hip replacement and surgery for nonhealing commuted fracture sustained last year.  Patient had surgery there was no immediate intra or postop complications.  Orthopedic surgeon Dr. Michelle followed patient.  He had minimal numbness and tingling of left hand which improved after few hours.  It was positional.    Patient also had elevated sugar and A1c was high around 7.9.  Patient is newly diagnosed diabetic with started patient on antidiabetic medication at the time of discharge, I had detailed discussion about diet exercise and monitoring sugars.    Patient is feeling much better and cleared by orthopedic surgeon for discharge today.        DISCHARGE Follow Up Recommendations for labs and diagnostics:   Discharge to home with outpatient physical therapy      Day of Discharge     Vital Signs:  Temp:  [96.9 °F (36.1 °C)-99.4 °F (37.4 °C)] 97.7 °F (36.5 °C)  Heart Rate:  [65-90] 90  Resp:  [12-18] 18  BP: ()/(55-99) 130/66  Flow (L/min):  [2] 2  FiO2 (%):  [0.6 %-100 %]  100 %    Physical Exam:    Heart regular, lungs clear, abdomen soft,   Right lower extremity with normal neurovascular bundle.          Pertinent  and/or Most Recent Results     LAB RESULTS:      Lab 05/12/22  0501 05/11/22  1437   HEMOGLOBIN 10.2* 12.1*   HEMATOCRIT 31.4* 36.9*         Lab 05/12/22  0501   SODIUM 137   POTASSIUM 4.6   CHLORIDE 104   CO2 21.6*   ANION GAP 11.4   BUN 14   CREATININE 0.94   EGFR 102.5   GLUCOSE 236*   CALCIUM 8.9                         Brief Urine Lab Results     None        Microbiology Results (last 10 days)     Procedure Component Value - Date/Time    COVID PRE-OP / PRE-PROCEDURE SCREENING ORDER (NO ISOLATION) - Swab, Nasopharynx [[id number]]  (Normal) Collected: 05/06/22 0855    Lab Status: Final result Specimen: Swab from Nasopharynx Updated: 05/06/22 2024    Narrative:      The following orders were created for panel order COVID PRE-OP / PRE-PROCEDURE SCREENING ORDER (NO ISOLATION) - Swab, Nasopharynx.  Procedure                               Abnormality         Status                     ---------                               -----------         ------                     COVID-19,APTIMA PANTHER(...[[id number]]  Normal              Final result                 Please view results for these tests on the individual orders.    COVID-19,APTIMA PANTHER(KAREN),[facility], NP/OP SWAB IN UTM/VTM/SALINE TRANSPORT MEDIA,24 HR TAT - Swab, Nasopharynx [[id number]]  (Normal) Collected: 05/06/22 0855    Lab Status: Final result Specimen: Swab from Nasopharynx Updated: 05/06/22 2024     COVID19 Not Detected    Narrative:      Fact sheet for providers: https://www.fda.gov/media/525612/download     Fact sheet for patients: https://www.fda.gov/media/794606/download    Test performed by RT PCR.          PROCEDURES:    [unfilled]    CT Pelvis Without Contrast    Result Date: 4/20/2022  Impression:   1. Internal fixation of the nondisplaced fracture of the right femoral neck without evidence of  hardware complications.  Estimated less than 10 percent solid bony bridging across the femoral neck component of the fracture.  Complete healing of the greater tuberosity and lateral base of the femoral head components. 2. No new fracture or dislocation. 3. Mild multifocal DJD.     YULISSA STOVALL MD       Electronically Signed and Approved By: YULISSA STOVALL MD on 4/20/2022 at 16:34             XR Hip With or Without Pelvis 2 - 3 View Right    Result Date: 5/11/2022  Impression:  Expected postop appearance following right total hip arthroplasty.      Peterson Howell MD       Electronically Signed and Approved By: Peterson Howell MD on 5/11/2022 at 14:16             XR Hip With or Without Pelvis 2 - 3 View Right    Result Date: 5/11/2022  Impression:  Intraoperative imaging during right total hip arthroplasty.  Details of the procedure can be found in the orthopedic surgeon's note.      Peterson Howell MD       Electronically Signed and Approved By: Peterson Howell MD on 5/11/2022 at 13:20                           Labs Pending at Discharge:        Discharge Details        Discharge Medications      New Medications      Instructions Start Date   clindamycin 300 MG capsule  Commonly known as: Cleocin   300 mg, Oral, 3 Times Daily      glipizide 2.5 MG 24 hr tablet  Commonly known as: Glucotrol XL   5 mg, Oral, Daily      metFORMIN  MG 24 hr tablet  Commonly known as: GLUCOPHAGE-XR   500 mg, Oral, Daily With Breakfast      oxyCODONE-acetaminophen 7.5-325 MG per tablet  Commonly known as: PERCOCET   1-2 tablets, Oral, Every 4 Hours PRN         Changes to Medications      Instructions Start Date   apixaban 2.5 MG tablet tablet  Commonly known as: ELIQUIS  What changed: when to take this   2.5 mg, Oral, 2 Times Daily         ASK your doctor about these medications      Instructions Start Date   metoprolol succinate XL 25 MG 24 hr tablet  Commonly known as: TOPROL-XL   25 mg, Oral, Every Morning, INST PER ANESTHESIA  PROTOCOL             Allergies   Allergen Reactions   • Cephalexin Hives         Discharge Disposition:    Home or Self Care    Diet:  1800 ADA      Discharge Activity:     Activity Instructions     Discharge Activity      Weightbearing as tolerated with walker  Outpatient physical therapy  Percocet prescription  Eliquis prescription  Dressing change postoperative day #2.  May place new Aquacel on anterior incision for 5 to 7 days.  Daily dressing changes over other incisions.  May shower after postoperative day #4            Future Appointments   Date Time Provider Department Center   5/26/2022  9:00 AM Yusuf Rubalcava PA [facility]       Additional Instructions for the Follow-ups that You Need to Schedule     Discharge Follow-up with Specified Provider: Michelle; 2 Weeks   As directed      To: Michelle    Follow Up: 2 Weeks               Time spent on Discharge including face to face service: 23 minutes.            I have dictated this note utilizing Dragon Dictation.             Please note that portions of this note were completed with a voice recognition program.             Part of this note may be an electronic transcription/translation of spoken language to printed text         using the Dragon Dictation System.       Electronically signed by Antonino Nicole MD, 05/12/22, 9:33 AM EDT.

## 2022-05-12 NOTE — THERAPY DISCHARGE NOTE
Acute Care - Occupational Therapy Discharge   [place]     Patient Name: Socrates Owen Jr.  : 1975  MRN: [id number]  Today's Date: 2022               Admit Date: 2022       ICD-10-CM ICD-9-CM   1. Difficulty walking  R26.2 719.7   2. Closed comminuted fracture of right hip with nonunion, subsequent encounter  S72.091K 733.82   3. Decreased activities of daily living (ADL)  Z78.9 V49.89     Patient Active Problem List   Diagnosis   • Comminuted fracture of right hip (HCC)   • Right Femoral Neck Open Reduction Internal Fixation    • Closed comminuted fracture of hip with nonunion   • Arthritis of right hip   • Numbness of fingers   • Hypertension     Past Medical History:   Diagnosis Date   • Arthritis    • Elevated cholesterol    • Femur fracture, right (HCC)     2021 ORIF   • Hypertension     ON MEDS   • Kidney stone      Past Surgical History:   Procedure Laterality Date   • HIP OPEN REDUCTION Right 2021    Procedure: FEMORAL NECK OPEN REDUCTION INTERNAL FIXATION;  Surgeon: Peterson Michelle MD;  Location: [facility];  Service: Orthopedics;  Laterality: Right;   • KNEE SURGERY Right     MENISCUS TEAR 2018       OT ASSESSMENT FLOWSHEET (last 12 hours)     OT Evaluation and Treatment    No documentation.                 Occupational Therapy Education                 Title: PT OT SLP Therapies (In Progress)     Topic: Occupational Therapy (In Progress)     Point: ADL training (Done)     Description:   Instruct learner(s) on proper safety adaptation and remediation techniques during self care or transfers.   Instruct in proper use of assistive devices.              Learning Progress Summary           Patient Acceptance, E,TB, VU by LF at 2022 2285                   Point: Home exercise program (Not Started)     Description:   Instruct learner(s) on appropriate technique for monitoring, assisting and/or progressing therapeutic exercises/activities.              Learner Progress:   Not documented in this visit.          Point: Precautions (Done)     Description:   Instruct learner(s) on prescribed precautions during self-care and functional transfers.              Learning Progress Summary           Patient Acceptance, E,TB, VU by  at 5/12/2022 1335                   Point: Body mechanics (Done)     Description:   Instruct learner(s) on proper positioning and spine alignment during self-care, functional mobility activities and/or exercises.              Learning Progress Summary           Patient Acceptance, E,TB, VU by  at 5/12/2022 1335                               User Key     Initials Effective Dates Name Provider Type Discipline     06/16/21 -  Halima Horne OT Occupational Therapist OT                OT Recommendation and Plan  Planned Therapy Interventions (OT): activity tolerance training, patient/caregiver education/training, BADL retraining, functional balance retraining, occupation/activity based interventions, transfer/mobility retraining  Therapy Frequency (OT): 5 times/wk  Plan of Care Review  Plan of Care Reviewed With: patient  Progress: no change  Outcome Evaluation: Patient presents with limitations in self-care, functional transfers, and balance. He would benefit from continued skilled occupational therapy services to maximize ADL performance and return home safely and independently.  Plan of Care Reviewed With: patient  Outcome Evaluation: Patient presents with limitations in self-care, functional transfers, and balance. He would benefit from continued skilled occupational therapy services to maximize ADL performance and return home safely and independently.      OT Rehab Goals     Row Name 05/12/22 0769             Bed Mobility Goal 1 (OT)    Activity/Assistive Device (Bed Mobility Goal 1, OT) bed mobility activities, all  -LF      [place] Level/Cues Needed (Bed Mobility Goal 1, OT) modified independence  -LF      Time Frame (Bed Mobility Goal 1, OT) long  term goal (LTG);10 days  -LF              Transfer Goal 1 (OT)    Activity/Assistive Device (Transfer Goal 1, OT) transfers, all;walker, rolling  -LF      [place] Level/Cues Needed (Transfer Goal 1, OT) modified independence  -LF      Time Frame (Transfer Goal 1, OT) long term goal (LTG);10 days  -LF              Bathing Goal 1 (OT)    Activity/Device (Bathing Goal 1, OT) bathing skills, all;lower body bathing  -LF      [place] Level/Cues Needed (Bathing Goal 1, OT) modified independence  -LF      Time Frame (Bathing Goal 1, OT) long term goal (LTG);10 days  -LF              Dressing Goal 1 (OT)    Activity/Device (Dressing Goal 1, OT) dressing skills, all;lower body dressing  -LF      [place]/Cues Needed (Dressing Goal 1, OT) modified independence  -LF      Time Frame (Dressing Goal 1, OT) long term goal (LTG);10 days  -LF              Toileting Goal 1 (OT)    Activity/Device (Toileting Goal 1, OT) toileting skills, all  -LF      [place] Level/Cues Needed (Toileting Goal 1, OT) modified independence  -LF      Time Frame (Toileting Goal 1, OT) long term goal (LTG);10 days  -LF            User Key  (r) = Recorded By, (t) = Taken By, (c) = Cosigned By    Initials Name Provider Type Discipline    LF Halima Horne, OT Occupational Therapist OT                 Outcome Measures     Row Name 05/12/22 1132 05/11/22 1874          How much help from another person do you currently need...    Turning from your back to your side while in flat bed without using bedrails? 4 (P)   -SH 4  -DELIO (r) SH (t) DELIO (c)     Moving from lying on back to sitting on the side of a flat bed without bedrails? 4 (P)   -SH 4  -DELIO (r) SH (t) DELIO (c)     Moving to and from a bed to a chair (including a wheelchair)? 4 (P)   -SH 3  -DELIO (r) SH (t) DELIO (c)     Standing up from a chair using your arms (e.g., wheelchair, bedside chair)? 4 (P)   -SH 3  -DELIO (r) SH (t) DELIO (c)     Climbing 3-5 steps with a railing? 4 (P)   -SH 3  -DELIO (r) SH  (t) DELIO (c)     To walk in hospital room? 4 (P)   -SH 3  -DELIO (r) SH (t) DELIO (c)     AM-PAC 6 Clicks Score (PT) 24 (P)   -SH 20  -DELIO (r) SH (t)            Functional Assessment    Outcome Measure Options AM-PAC 6 Clicks Basic Mobility (PT) (P)   -SH AM-PAC 6 Clicks Basic Mobility (PT)  -DELIO (r) SH (t) DELIO (c)           User Key  (r) = Recorded By, (t) = Taken By, (c) = Cosigned By    Initials Name Provider Type    Liborio Palacios, PT Physical Therapist     Rod Gracia, PT Student PT Student                Time Calculation:    Time Calculation- OT     Row Name 05/12/22 1335 05/12/22 1126          Time Calculation- OT    OT Received On 05/12/22  -LF --     OT Goal Re-Cert Due Date 05/21/22  -LF --            Timed Charges    76288 - Gait Training Minutes  -- 6 (P)   -SH     48122 - OT Therapeutic Activity Minutes 12  -LF --     62259 - OT Self Care/Mgmt Minutes 12  -LF --            Untimed Charges    OT Eval/Re-eval Minutes 20  -LF --            Total Minutes    Timed Charges Total Minutes 24  -LF 6 (P)   -SH     Untimed Charges Total Minutes 20  -LF --      Total Minutes 44  -LF 6 (P)   -SH           User Key  (r) = Recorded By, (t) = Taken By, (c) = Cosigned By    Initials Name Provider Type     Halima Horne, OT Occupational Therapist     Rod Gracia, PT Student PT Student              Timed Therapy Charges  Total Units: 2    Charges  Total Units: 2    Procedure Name Documented Minutes Units Code    HC OT THERAPEUTIC ACT EA 15 MIN 12  1    94629 (CPT®)      HC OT SELF CARE/MGMT/TRAIN EA 15 MIN 12  1    35582 (CPT®)               Documented Minutes  Total Minutes: 24    Therapy Provided Minutes    74175 - OT Therapeutic Activity Minutes 12    60078 - OT Self Care/Mgmt Minutes 12              Therapy Charges for Today     Code Description Service Date Service Provider Modifiers Qty    71456566104 HC OT THERAPEUTIC ACT EA 15 MIN 5/12/2022 Halima Horne, OT GO 1    95051378206 HC OT SELF  CARE/MGMT/TRAIN EA 15 MIN 5/12/2022 Halima Horne, OT GO 1    [id number] HC OT EVAL LOW COMPLEXITY 2 5/12/2022 Halima Horne OT GO 1               OT Discharge Summary  Anticipated Discharge Disposition (OT): home with outpatient therapy services, home with assist  Reason for Discharge: Discharge from facility, Per MD order  Outcomes Achieved: Discharge from facility occurred on same date as evluation  Discharge Destination: Home with outpatient services, Home with assist    Halima Horne OT  5/12/2022

## 2022-05-16 NOTE — THERAPY DISCHARGE NOTE
Acute Care - Occupational Therapy Discharge   [place]     Patient Name: Socrates Owen Jr.  : 1975  MRN: [id number]  Today's Date: 2022               Admit Date: 2022       ICD-10-CM ICD-9-CM   1. Difficulty walking  R26.2 719.7   2. Closed comminuted fracture of right hip with nonunion, subsequent encounter  S72.801K 733.82   3. Decreased activities of daily living (ADL)  Z78.9 V49.89     Patient Active Problem List   Diagnosis   • Comminuted fracture of right hip (HCC)   • Right Femoral Neck Open Reduction Internal Fixation    • Closed comminuted fracture of hip with nonunion   • Arthritis of right hip   • Numbness of fingers   • Hypertension     Past Medical History:   Diagnosis Date   • Arthritis    • Elevated cholesterol    • Femur fracture, right (HCC)     2021 ORIF   • Hypertension     ON MEDS   • Kidney stone      Past Surgical History:   Procedure Laterality Date   • HARDWARE REMOVAL Right 2022    Procedure: HARDWARE REMOVAL RIGHT HIP;  Surgeon: Peterson Michelle MD;  Location: [facility];  Service: Orthopedics;  Laterality: Right;   • HIP OPEN REDUCTION Right 2021    Procedure: FEMORAL NECK OPEN REDUCTION INTERNAL FIXATION;  Surgeon: Peterson Michelle MD;  Location: [facility] OR;  Service: Orthopedics;  Laterality: Right;   • KNEE SURGERY Right     MENISCUS TEAR 2018   • TOTAL HIP ARTHROPLASTY Right 2022    Procedure: TOTAL HIP ARTHROPLASTY ANTERIOR AND REMOVAL OF HARDWARE;  Surgeon: Peterson Michelle MD;  Location: [facility] OR;  Service: Orthopedics;  Laterality: Right;       OT ASSESSMENT FLOWSHEET (last 12 hours)     OT Evaluation and Treatment    No documentation.                 Occupational Therapy Education                 Title: PT OT SLP Therapies (In Progress)     Topic: Occupational Therapy (In Progress)     Point: ADL training (Done)     Description:   Instruct learner(s) on proper safety adaptation and remediation techniques during self  care or transfers.   Instruct in proper use of assistive devices.              Learning Progress Summary           Patient Acceptance, E,TB, VU by  at 5/12/2022 1335                   Point: Home exercise program (Not Started)     Description:   Instruct learner(s) on appropriate technique for monitoring, assisting and/or progressing therapeutic exercises/activities.              Learner Progress:  Not documented in this visit.          Point: Precautions (Done)     Description:   Instruct learner(s) on prescribed precautions during self-care and functional transfers.              Learning Progress Summary           Patient Acceptance, E,TB, VU by  at 5/12/2022 1335                   Point: Body mechanics (Done)     Description:   Instruct learner(s) on proper positioning and spine alignment during self-care, functional mobility activities and/or exercises.              Learning Progress Summary           Patient Acceptance, E,TB, VU by  at 5/12/2022 1335                               User Key     Initials Effective Dates Name Provider Type Discipline     06/16/21 -  Halima Horne OT Occupational Therapist OT                OT Recommendation and Plan  Planned Therapy Interventions (OT): activity tolerance training, patient/caregiver education/training, BADL retraining, functional balance retraining, occupation/activity based interventions, transfer/mobility retraining  Therapy Frequency (OT): 5 times/wk  Plan of Care Review  Plan of Care Reviewed With: patient  Progress: no change  Outcome Evaluation: Patient presents with limitations in self-care, functional transfers, and balance. He would benefit from continued skilled occupational therapy services to maximize ADL performance and return home safely and independently.  Plan of Care Reviewed With: patient  Outcome Evaluation: Patient presents with limitations in self-care, functional transfers, and balance. He would benefit from continued skilled  occupational therapy services to maximize ADL performance and return home safely and independently.             Time Calculation:     Timed Therapy Charges  Total Units: 2    Charges  Total Units: 2    Procedure Name Documented Minutes Units Code    HC OT THERAPEUTIC ACT EA 15 MIN 12  1    86542 (CPT®)      HC OT SELF CARE/MGMT/TRAIN EA 15 MIN 12  1    31838 (CPT®)               Documented Minutes  Total Minutes: 24    Therapy Provided Minutes    75354 - OT Therapeutic Activity Minutes 12    38463 - OT Self Care/Mgmt Minutes 12                       OT Discharge Summary  Anticipated Discharge Disposition (OT): home with outpatient therapy services, home with assist  Reason for Discharge: Discharge from facility, Per MD order  Outcomes Achieved: Discharge from facility occurred on same date as evluation  Discharge Destination: Home with outpatient services, Home with assist    Halima Horne OT  5/16/2022

## 2022-05-17 ENCOUNTER — TELEPHONE (OUTPATIENT)
Dept: ORTHOPEDIC SURGERY | Facility: CLINIC | Age: 47
End: 2022-05-17

## 2022-05-17 DIAGNOSIS — S72.091K: ICD-10-CM

## 2022-05-17 RX ORDER — HYDROCODONE BITARTRATE AND ACETAMINOPHEN 7.5; 325 MG/1; MG/1
TABLET ORAL
Qty: 12 TABLET | Refills: 0 | OUTPATIENT
Start: 2022-05-17

## 2022-05-17 RX ORDER — OXYCODONE AND ACETAMINOPHEN 7.5; 325 MG/1; MG/1
1-2 TABLET ORAL EVERY 4 HOURS PRN
Qty: 40 TABLET | Refills: 0 | Status: SHIPPED | OUTPATIENT
Start: 2022-05-17 | End: 2022-05-23 | Stop reason: SDUPTHER

## 2022-05-17 NOTE — TELEPHONE ENCOUNTER
POST OP PHONE CALL.  PATIENT REQUESTING A REFILL OF PAIN MEDS.  [facility]

## 2022-05-23 ENCOUNTER — TELEPHONE (OUTPATIENT)
Dept: ORTHOPEDIC SURGERY | Facility: CLINIC | Age: 47
End: 2022-05-23

## 2022-05-23 DIAGNOSIS — S72.091K: ICD-10-CM

## 2022-05-23 RX ORDER — OXYCODONE AND ACETAMINOPHEN 7.5; 325 MG/1; MG/1
1-2 TABLET ORAL EVERY 4 HOURS PRN
Qty: 40 TABLET | Refills: 0 | Status: SHIPPED | OUTPATIENT
Start: 2022-05-23 | End: 2022-05-27 | Stop reason: SDUPTHER

## 2022-05-24 ENCOUNTER — OFFICE VISIT (OUTPATIENT)
Dept: ORTHOPEDIC SURGERY | Facility: CLINIC | Age: 47
End: 2022-05-24

## 2022-05-24 VITALS — OXYGEN SATURATION: 98 % | WEIGHT: 228.2 LBS | HEART RATE: 92 BPM | HEIGHT: 70 IN | BODY MASS INDEX: 32.67 KG/M2

## 2022-05-24 DIAGNOSIS — M25.551 RIGHT HIP PAIN: Primary | ICD-10-CM

## 2022-05-24 DIAGNOSIS — Z96.641 S/P TOTAL RIGHT HIP ARTHROPLASTY: ICD-10-CM

## 2022-05-24 PROCEDURE — 99024 POSTOP FOLLOW-UP VISIT: CPT | Performed by: ORTHOPAEDIC SURGERY

## 2022-05-24 NOTE — PROGRESS NOTES
"Chief Complaint  Follow-up of the Right Hip     Subjective      Socrates Owen Jr. presents to [facility] for follow up evaluation of the right hip. He is S/P Total Hip Arthroplasty Anterior And Removal Of Hardware 5/11/22. His staples were removed today. He is ambulating with a cane. He reports on Thursday he noticed some swelling to the hip. He has no other complaints. He has no injury. He has been attending physical therapy.     Allergies   Allergen Reactions   • Cephalexin Hives        Social History     Socioeconomic History   • Marital status:    Tobacco Use   • Smoking status: Never Smoker   • Smokeless tobacco: Never Used   Vaping Use   • Vaping Use: Never used   Substance and Sexual Activity   • Alcohol use: Never   • Drug use: Never   • Sexual activity: Defer     Partners: Female        Review of Systems     Objective   Vital Signs:   Pulse 92   Ht 177.8 cm (70\")   Wt 104 kg (228 lb 3.2 oz)   SpO2 98%   BMI 32.74 kg/m²       Physical Exam  Constitutional:       Appearance: Normal appearance. The patient is well-developed and normal weight.   HENT:      Head: Normocephalic.      Right Ear: Hearing and external ear normal.      Left Ear: Hearing and external ear normal.      Nose: Nose normal.   Eyes:      Conjunctiva/sclera: Conjunctivae normal.   Cardiovascular:      Rate and Rhythm: Normal rate.   Pulmonary:      Effort: Pulmonary effort is normal.      Breath sounds: No wheezing or rales.   Abdominal:      Palpations: Abdomen is soft.      Tenderness: There is no abdominal tenderness.   Musculoskeletal:      Cervical back: Normal range of motion.   Skin:     Findings: No rash.   Neurological:      Mental Status: The patient is alert and oriented to person, place, and time.   Psychiatric:         Mood and Affect: Mood and affect normal.         Judgment: Judgment normal.       Ortho Exam      Right hip- incision well healing. No signs of infection. Neurovascularly " intact. Positive EHL, FHL, GS and TA. Sensation intact to all 5 nerves of the foot. Positive pulses. Mild swelling to the anterior hip. Tender over the incision site.    Procedures    X-Ray Report:  Right hip(s) X-Ray  Indication: Evaluation of right hip pain  AP and Lateral view(s)  Findings: intact right hip replacement. No signs of loosening, subsidence or periprosthetic fracture.   Prior studies available for comparison: yes         Imaging Results (Most Recent)     Procedure Component Value Units Date/Time    XR Hip With or Without Pelvis 2 - 3 View Right [[id number]] Resulted: 05/24/22 1331     Updated: 05/24/22 1346           Result Review :       FL < 1 Hour    Result Date: 5/11/2022  Narrative: This procedure was auto-finalized with no dictation required.    XR Hip With or Without Pelvis 2 - 3 View Right    Result Date: 5/11/2022  Narrative: PROCEDURE: XR HIP W OR WO PELVIS 2-3 VIEW RIGHT  COMPARISON: [facility], , XR HIP W OR WO PELVIS 2-3 VIEW RIGHT, 5/11/2022, 10:33.  INDICATIONS: Post-Op Right Hip Arthroplasty  FINDINGS:  There are postoperative changes of right total hip arthroplasty.  The prosthesis appears in appropriate position and alignment.      Impression:  Expected postop appearance following right total hip arthroplasty.      Peterson Howell MD       Electronically Signed and Approved By: Peterson Howell MD on 5/11/2022 at 14:16             XR Hip With or Without Pelvis 2 - 3 View Right    Result Date: 5/11/2022  Narrative: PROCEDURE: XR HIP W OR WO PELVIS 2-3 VIEW RIGHT  COMPARISON: [facility] , CR, XR HIP W OR WO PELVIS 2-3 VIEW RIGHT, 12/07/2021, 8:45.  INDICATIONS: NONUNION OF HIP FX/FLUORO TIME 2.50 MINUTES/38.24 mGy/7 IMAGES  FINDINGS:  A total of 7 intraoperative images were obtained during right total hip arthroplasty.      Impression:  Intraoperative imaging during right total hip arthroplasty.  Details of the procedure can be found in the orthopedic surgeon's  note.      Peterson Howell MD       Electronically Signed and Approved By: Peterson Howell MD on 5/11/2022 at 13:20                      Assessment and Plan     Diagnoses and all orders for this visit:    1. Right hip pain (Primary)  -     XR Hip With or Without Pelvis 2 - 3 View Right    2. S/P total right hip arthroplasty        Discussed the treatment plan with the patient. I reviewed the x-rays that were obtained today with the patient.  Plan to stop eliquis and start aspirin 325mg, prescription given today. Plan to continue physical therapy.     Call or return if worsening symptoms.    Follow Up     4 weeks with repeat x-rays.       Patient was given instructions and counseling regarding his condition or for health maintenance advice. Please see specific information pulled into the AVS if appropriate.     Scribed for Peterson Michelle MD by Dee Zavala.  05/24/22   13:23 EDT    I have personally performed the services described in this document as scribed by the above individual and it is both accurate and complete. Peterson Michelle MD 05/25/22

## 2022-05-27 ENCOUNTER — TELEPHONE (OUTPATIENT)
Dept: ORTHOPEDIC SURGERY | Facility: CLINIC | Age: 47
End: 2022-05-27

## 2022-05-27 DIAGNOSIS — S72.091K: ICD-10-CM

## 2022-05-27 RX ORDER — OXYCODONE AND ACETAMINOPHEN 7.5; 325 MG/1; MG/1
1-2 TABLET ORAL EVERY 4 HOURS PRN
Qty: 40 TABLET | Refills: 0 | Status: SHIPPED | OUTPATIENT
Start: 2022-05-27 | End: 2022-06-06 | Stop reason: SDUPTHER

## 2022-05-27 NOTE — TELEPHONE ENCOUNTER
PATIENT CALLED AND WILL BE OUT OF PAIN MEDS ON Monday. REQUESTING THAT WE CALL MEDS IN TO BE AT THE PHARMACY FOR ?  [facility]

## 2022-06-06 ENCOUNTER — TELEPHONE (OUTPATIENT)
Dept: ORTHOPEDIC SURGERY | Facility: CLINIC | Age: 47
End: 2022-06-06

## 2022-06-06 DIAGNOSIS — S72.091K: ICD-10-CM

## 2022-06-06 RX ORDER — OXYCODONE AND ACETAMINOPHEN 7.5; 325 MG/1; MG/1
1-2 TABLET ORAL EVERY 4 HOURS PRN
Qty: 40 TABLET | Refills: 0 | OUTPATIENT
Start: 2022-06-06

## 2022-06-06 RX ORDER — OXYCODONE AND ACETAMINOPHEN 7.5; 325 MG/1; MG/1
1-2 TABLET ORAL EVERY 4 HOURS PRN
Qty: 40 TABLET | Refills: 0 | Status: SHIPPED | OUTPATIENT
Start: 2022-06-06 | End: 2022-06-13 | Stop reason: SDUPTHER

## 2022-06-13 ENCOUNTER — TELEPHONE (OUTPATIENT)
Dept: ORTHOPEDIC SURGERY | Facility: CLINIC | Age: 47
End: 2022-06-13

## 2022-06-13 DIAGNOSIS — S72.091K: ICD-10-CM

## 2022-06-13 RX ORDER — OXYCODONE AND ACETAMINOPHEN 7.5; 325 MG/1; MG/1
1-2 TABLET ORAL EVERY 4 HOURS PRN
Qty: 40 TABLET | Refills: 0 | Status: SHIPPED | OUTPATIENT
Start: 2022-06-13 | End: 2022-06-20 | Stop reason: SDUPTHER

## 2022-06-20 ENCOUNTER — TELEPHONE (OUTPATIENT)
Dept: ORTHOPEDIC SURGERY | Facility: CLINIC | Age: 47
End: 2022-06-20

## 2022-06-20 DIAGNOSIS — S72.091K: ICD-10-CM

## 2022-06-20 RX ORDER — OXYCODONE AND ACETAMINOPHEN 7.5; 325 MG/1; MG/1
1 TABLET ORAL EVERY 4 HOURS PRN
Qty: 40 TABLET | Refills: 0 | Status: SHIPPED | OUTPATIENT
Start: 2022-06-20 | End: 2022-06-27

## 2022-06-24 ENCOUNTER — OFFICE VISIT (OUTPATIENT)
Dept: ORTHOPEDIC SURGERY | Facility: CLINIC | Age: 47
End: 2022-06-24

## 2022-06-24 VITALS — OXYGEN SATURATION: 98 % | BODY MASS INDEX: 32.64 KG/M2 | HEART RATE: 70 BPM | WEIGHT: 228 LBS | HEIGHT: 70 IN

## 2022-06-24 DIAGNOSIS — M25.551 RIGHT HIP PAIN: Primary | ICD-10-CM

## 2022-06-24 DIAGNOSIS — Z96.641 S/P TOTAL RIGHT HIP ARTHROPLASTY: ICD-10-CM

## 2022-06-24 PROCEDURE — 99024 POSTOP FOLLOW-UP VISIT: CPT | Performed by: ORTHOPAEDIC SURGERY

## 2022-06-24 RX ORDER — MULTIVITAMIN WITH FOLIC ACID 400 MCG
TABLET ORAL
COMMUNITY
Start: 2022-06-20

## 2022-06-24 NOTE — PROGRESS NOTES
"Chief Complaint  Follow-up of the Right Hip     Subjective      Socrates Owen Jr. presents to [facility] for follow up evaluation of the right hip. He is S/P Total Hip Arthroplasty Anterior And Removal Of Hardware 5/11/22. He reports he fell on Sunday. He is still having pain. He is ambulating with a cane. He is still attending physical therapy.     Allergies   Allergen Reactions   • Cephalexin Hives        Social History     Socioeconomic History   • Marital status:    Tobacco Use   • Smoking status: Never Smoker   • Smokeless tobacco: Never Used   Vaping Use   • Vaping Use: Never used   Substance and Sexual Activity   • Alcohol use: Never   • Drug use: Never   • Sexual activity: Defer     Partners: Female        Review of Systems     Objective   Vital Signs:   Pulse 70   Ht 177.8 cm (70\")   Wt 103 kg (228 lb)   SpO2 98%   BMI 32.71 kg/m²       Physical Exam  Constitutional:       Appearance: Normal appearance. The patient is well-developed and normal weight.   HENT:      Head: Normocephalic.      Right Ear: Hearing and external ear normal.      Left Ear: Hearing and external ear normal.      Nose: Nose normal.   Eyes:      Conjunctiva/sclera: Conjunctivae normal.   Cardiovascular:      Rate and Rhythm: Normal rate.   Pulmonary:      Effort: Pulmonary effort is normal.      Breath sounds: No wheezing or rales.   Abdominal:      Palpations: Abdomen is soft.      Tenderness: There is no abdominal tenderness.   Musculoskeletal:      Cervical back: Normal range of motion.   Skin:     Findings: No rash.   Neurological:      Mental Status: The patient is alert and oriented to person, place, and time.   Psychiatric:         Mood and Affect: Mood and affect normal.         Judgment: Judgment normal.       Ortho Exam      Right hip- incision well healing. No signs of infection. Neurovascularly intact. Positive EHL, FHL, GS and TA. Sensation intact to all 5 nerves of the foot. " Positive pulses. Mild swelling to the anterior hip. Near equal leg length. Antalgic gait with cane. Flexion 90. External Rotation 25. Internal rotation 20    Procedures    X-Ray Report:  Right hip(s) X-Ray  Indication: Evaluation of right hip pain  AP and Lateral view(s)  Findings: intact right hip replacement. No signs of loosening, subsidence or periprosthetic fracture.   Prior studies available for comparison: yes        Imaging Results (Most Recent)     Procedure Component Value Units Date/Time    XR Hip With or Without Pelvis 2 - 3 View Right [[id number]] Resulted: 06/24/22 0816     Updated: 06/24/22 0823           Result Review :       No results found.           Assessment and Plan     Diagnoses and all orders for this visit:    1. Right hip pain (Primary)  -     XR Hip With or Without Pelvis 2 - 3 View Right    2. S/P total right hip arthroplasty        Discussed the treatment plan with the patient.  I reviewed the x-rays that were obtained today with the patient. Plan to continue physical therapy. He can bear weight as tolerated.     Call or return if worsening symptoms.    Follow Up     6 weeks      Patient was given instructions and counseling regarding his condition or for health maintenance advice. Please see specific information pulled into the AVS if appropriate.     Scribed for Peterson Michelle MD by Dee Zavala.  06/24/22   08:34 EDT    I have personally performed the services described in this document as scribed by the above individual and it is both accurate and complete. Peterson Michelle MD 06/24/22

## 2022-06-27 ENCOUNTER — TELEPHONE (OUTPATIENT)
Dept: ORTHOPEDIC SURGERY | Facility: CLINIC | Age: 47
End: 2022-06-27

## 2022-06-27 DIAGNOSIS — S72.091K: ICD-10-CM

## 2022-06-27 RX ORDER — HYDROCODONE BITARTRATE AND ACETAMINOPHEN 7.5; 325 MG/1; MG/1
1 TABLET ORAL EVERY 4 HOURS PRN
Qty: 30 TABLET | Refills: 0 | Status: SHIPPED | OUTPATIENT
Start: 2022-06-27 | End: 2022-07-05 | Stop reason: SDUPTHER

## 2022-06-27 NOTE — TELEPHONE ENCOUNTER
Patient requesting refill on pain medicine. Informed patient that we were sending in Norco at this point. Patient understood.

## 2022-07-01 ENCOUNTER — TELEPHONE (OUTPATIENT)
Dept: ORTHOPEDIC SURGERY | Facility: CLINIC | Age: 47
End: 2022-07-01

## 2022-07-01 DIAGNOSIS — S72.091K: ICD-10-CM

## 2022-07-01 NOTE — TELEPHONE ENCOUNTER
PATIENT REQUESTING PAIN MEDICATION REFILL. STATES HE WILL BE OUT Tuesday NEXT WEEK AND IS CALLING AHEAD DUE TO THE OFFICE BEING CLOSED Monday. [facility].

## 2022-07-05 RX ORDER — HYDROCODONE BITARTRATE AND ACETAMINOPHEN 7.5; 325 MG/1; MG/1
1 TABLET ORAL EVERY 4 HOURS PRN
Qty: 30 TABLET | Refills: 0 | Status: SHIPPED | OUTPATIENT
Start: 2022-07-05 | End: 2022-07-11 | Stop reason: SDUPTHER

## 2022-07-11 ENCOUNTER — TELEPHONE (OUTPATIENT)
Dept: ORTHOPEDIC SURGERY | Facility: CLINIC | Age: 47
End: 2022-07-11

## 2022-07-11 DIAGNOSIS — S72.091K: ICD-10-CM

## 2022-07-11 RX ORDER — HYDROCODONE BITARTRATE AND ACETAMINOPHEN 7.5; 325 MG/1; MG/1
1 TABLET ORAL EVERY 4 HOURS PRN
Qty: 30 TABLET | Refills: 0 | Status: SHIPPED | OUTPATIENT
Start: 2022-07-11 | End: 2022-07-21 | Stop reason: SDUPTHER

## 2022-07-11 NOTE — TELEPHONE ENCOUNTER
Called and spoke with patient regarding pain medicine. Informed him that he needs to stretch these out longer than a week at this point since he is already 2 months out. Patient expressed understanding.

## 2022-07-21 ENCOUNTER — TELEPHONE (OUTPATIENT)
Dept: ORTHOPEDIC SURGERY | Facility: CLINIC | Age: 47
End: 2022-07-21

## 2022-07-21 DIAGNOSIS — S72.091K: ICD-10-CM

## 2022-07-21 RX ORDER — HYDROCODONE BITARTRATE AND ACETAMINOPHEN 7.5; 325 MG/1; MG/1
1 TABLET ORAL EVERY 6 HOURS PRN
Qty: 30 TABLET | Refills: 0 | Status: SHIPPED | OUTPATIENT
Start: 2022-07-21 | End: 2022-08-01 | Stop reason: SDUPTHER

## 2022-08-01 ENCOUNTER — TELEPHONE (OUTPATIENT)
Dept: ORTHOPEDIC SURGERY | Facility: CLINIC | Age: 47
End: 2022-08-01

## 2022-08-01 DIAGNOSIS — S72.091K: ICD-10-CM

## 2022-08-01 RX ORDER — HYDROCODONE BITARTRATE AND ACETAMINOPHEN 7.5; 325 MG/1; MG/1
1 TABLET ORAL EVERY 6 HOURS PRN
Qty: 30 TABLET | Refills: 0 | Status: SHIPPED | OUTPATIENT
Start: 2022-08-01

## 2022-08-01 NOTE — TELEPHONE ENCOUNTER
PATIENT CALLED AND STATES HE THINKS THIS IS THE LAST PAIN MEDS REQUEST HE WILL NEED.  REQUESTING REFILL.   [facility]

## 2022-08-04 ENCOUNTER — TELEPHONE (OUTPATIENT)
Dept: ORTHOPEDIC SURGERY | Facility: CLINIC | Age: 47
End: 2022-08-04

## 2022-08-04 NOTE — TELEPHONE ENCOUNTER
Called and spoke with patient regarding pain medicine. Informed him that his insurance will no longer cover the prescription and that he would have to pay out of pocket for this. Also informed him that per Dr. Michelle that this was his last fill of pain medicine and that he would need to go to his PCP if needed more refills. Patient understood and stated he didn't need any more.

## 2022-08-11 ENCOUNTER — OFFICE VISIT (OUTPATIENT)
Dept: ORTHOPEDIC SURGERY | Facility: CLINIC | Age: 47
End: 2022-08-11

## 2022-08-11 VITALS — HEIGHT: 70 IN | BODY MASS INDEX: 32.35 KG/M2 | WEIGHT: 226 LBS | OXYGEN SATURATION: 98 % | HEART RATE: 98 BPM

## 2022-08-11 DIAGNOSIS — Z47.89 AFTERCARE FOLLOWING SURGERY OF THE MUSCULOSKELETAL SYSTEM: Primary | ICD-10-CM

## 2022-08-11 DIAGNOSIS — Z47.89 AFTERCARE FOLLOWING SURGERY OF THE MUSCULOSKELETAL SYSTEM: ICD-10-CM

## 2022-08-11 PROCEDURE — 99213 OFFICE O/P EST LOW 20 MIN: CPT | Performed by: PHYSICIAN ASSISTANT

## 2022-08-11 RX ORDER — DICLOFENAC SODIUM 75 MG/1
75 TABLET, DELAYED RELEASE ORAL 2 TIMES DAILY
Qty: 60 TABLET | Refills: 2 | Status: SHIPPED | OUTPATIENT
Start: 2022-08-11 | End: 2022-11-18

## 2022-08-11 RX ORDER — CYCLOBENZAPRINE HCL 10 MG
10 TABLET ORAL 3 TIMES DAILY PRN
Qty: 30 TABLET | Refills: 0 | Status: SHIPPED | OUTPATIENT
Start: 2022-08-11 | End: 2022-09-26

## 2022-08-11 RX ORDER — ASPIRIN 81 MG/1
81 TABLET ORAL DAILY
COMMUNITY

## 2022-08-11 RX ORDER — HYDROCODONE BITARTRATE AND ACETAMINOPHEN 7.5; 325 MG/1; MG/1
1 TABLET ORAL EVERY 4 HOURS PRN
Qty: 40 TABLET | Refills: 0 | Status: SHIPPED | OUTPATIENT
Start: 2022-08-11

## 2022-08-11 NOTE — PROGRESS NOTES
"Chief Complaint  Pain and Follow-up of the Right Hip    Subjective          Socrates Owen Jr. is a 47 y.o. male  presents to [facility] for   History of Present Illness      Patient presents with his wife for follow-up evaluation of right total hip arthroplasty, removal of hardware, 5/11/2022.  Patient states he has pain in his posterior right hip as well as his thighs/femur.  He has been requesting pain medication refills in the past he states that he was told he would have to be referred to pain management through his primary care physician.  He states he has an appoint with pain management at the end of this month.  He is requesting another pain medication refill to get him to his appointment at the end of August.  Patient has been doing therapy since the last visit he was seen on 6/24/2022 by Dr. Michelle.  He states no progress has been obtained with his right hip pain or range of motion and strength.  He states he has to adjust how he walks and how he sits due to pain in the hip.  He denies numbness and tingling.  He is also planning on seeing his primary care physician for back pain/issues.      Allergies   Allergen Reactions   • Cephalexin Hives        Social History     Socioeconomic History   • Marital status:    Tobacco Use   • Smoking status: Never Smoker   • Smokeless tobacco: Never Used   Vaping Use   • Vaping Use: Never used   Substance and Sexual Activity   • Alcohol use: Never   • Drug use: Never   • Sexual activity: Defer     Partners: Female        REVIEW OF SYSTEMS    Constitutional: Denies fevers, chills, weight loss  Cardiovascular: Denies chest pain, shortness of breath  Skin: Denies rashes, acute skin changes  Neurologic: Denies headache, loss of consciousness  MSK: Right hip pain      Objective   Vital Signs:   Pulse 98   Ht 177.8 cm (70\")   Wt 103 kg (226 lb)   SpO2 98%   BMI 32.43 kg/m²     Body mass index is 32.43 kg/m².    Physical " Exam    Right hip: Tender palpation of the posterior hip in his buttock region, flexion 90, external rotation 25, internal rotation 20, patient ambulates with antalgic gait.    Procedures    Imaging Results (Most Recent)     Procedure Component Value Units Date/Time    XR Hip With or Without Pelvis 2 - 3 View Right [[id number]] Resulted: 08/11/22 1205     Updated: 08/11/22 1205    Narrative:      • View:AP and Lateral view(s)  • Site: Right hip  • Indication: Right hip pain  • Study: X-rays ordered, taken in the office, and reviewed today  • X-ray: Intact right hip replacement, no signs of loosening, subsidence   or periprosthetic fracture  • Comparative data: No comparative data found             Result Review :   The following data was reviewed by: STEFFANIE Rincon on 08/11/2022:  Data reviewed: Radiologic studies Reviewed by me with the patient and his wife, Dr. Michelle also reviewed.             Assessment and Plan    Diagnoses and all orders for this visit:    1. Aftercare following surgery of right total hip arthroplasty and removal of hardware, 5/11/22 (Primary)  -     XR Hip With or Without Pelvis 2 - 3 View Right    2. Aftercare following surgery of the musculoskeletal system  -     XR Hip With or Without Pelvis 2 - 3 View Right    Other orders  -     cyclobenzaprine (FLEXERIL) 10 MG tablet; Take 1 tablet by mouth 3 (Three) Times a Day As Needed for Muscle Spasms.  Dispense: 30 tablet; Refill: 0  -     diclofenac (VOLTAREN) 75 MG EC tablet; Take 1 tablet by mouth 2 (Two) Times a Day.  Dispense: 60 tablet; Refill: 2        Reviewed x-rays with the patient and his wife, Dr. Michelle reviewed them, patient was advised we will give him order for new prescription of pain medication he was also given Flexeril and diclofenac take as directed.  He will take the Flexeril mainly at night he was advised to avoid driving working or operating heavy machinery while taking this medicine.  Follow-up in 3  months for recheck with x-rays.  Continue home exercises.  Continue plan for evaluation of his back with his primary care physician as well as continue pain management evaluation.    Call or return if worsening symptoms.    Follow Up   Return in about 3 months (around 11/11/2022) for Recheck.  Patient was given instructions and counseling regarding his condition or for health maintenance advice. Please see specific information pulled into the AVS if appropriate.

## 2022-08-11 NOTE — TELEPHONE ENCOUNTER
Patient was seen in the office today and requesting a refill on pain medicine. Is going to pain management in two weeks and understands this will be the last time until he goes to pain management.

## 2022-09-26 RX ORDER — CYCLOBENZAPRINE HCL 10 MG
10 TABLET ORAL 3 TIMES DAILY PRN
Qty: 30 TABLET | Refills: 0 | Status: SHIPPED | OUTPATIENT
Start: 2022-09-26 | End: 2022-10-17

## 2022-10-01 ENCOUNTER — HOSPITAL ENCOUNTER (OUTPATIENT)
Dept: GENERAL RADIOLOGY | Facility: HOSPITAL | Age: 47
Discharge: HOME | End: 2022-10-01
Admitting: STUDENT IN AN ORGANIZED HEALTH CARE EDUCATION/TRAINING PROGRAM

## 2022-10-01 ENCOUNTER — TRANSCRIBE ORDERS (OUTPATIENT)
Dept: GENERAL RADIOLOGY | Facility: HOSPITAL | Age: 47
End: 2022-10-01

## 2022-10-01 DIAGNOSIS — M51.36 DEGENERATION OF LUMBAR INTERVERTEBRAL DISC: ICD-10-CM

## 2022-10-01 DIAGNOSIS — M51.36 DEGENERATION OF LUMBAR INTERVERTEBRAL DISC: Primary | ICD-10-CM

## 2022-10-01 DIAGNOSIS — M51.36 DEGENERATION, INTERVERTEBRAL DISC, LUMBAR: Primary | ICD-10-CM

## 2022-10-01 PROCEDURE — 72100 X-RAY EXAM L-S SPINE 2/3 VWS: CPT

## 2022-10-17 ENCOUNTER — TRANSCRIBE ORDERS (OUTPATIENT)
Dept: ADMINISTRATIVE | Facility: HOSPITAL | Age: 47
End: 2022-10-17

## 2022-10-17 DIAGNOSIS — M54.50 ACUTE LOW BACK PAIN WITHOUT SCIATICA, UNSPECIFIED BACK PAIN LATERALITY: Primary | ICD-10-CM

## 2022-10-17 RX ORDER — CYCLOBENZAPRINE HCL 10 MG
10 TABLET ORAL 3 TIMES DAILY PRN
Qty: 30 TABLET | Refills: 0 | Status: SHIPPED | OUTPATIENT
Start: 2022-10-17

## 2022-10-19 ENCOUNTER — HOSPITAL ENCOUNTER (OUTPATIENT)
Dept: MRI IMAGING | Facility: HOSPITAL | Age: 47
Discharge: HOME | End: 2022-10-19

## 2022-10-19 DIAGNOSIS — M54.50 ACUTE LOW BACK PAIN WITHOUT SCIATICA, UNSPECIFIED BACK PAIN LATERALITY: ICD-10-CM

## 2022-10-19 PROCEDURE — 72148 MRI LUMBAR SPINE W/O DYE: CPT

## 2022-11-11 RX ORDER — CYCLOBENZAPRINE HCL 10 MG
10 TABLET ORAL 3 TIMES DAILY PRN
Qty: 30 TABLET | Refills: 0 | OUTPATIENT
Start: 2022-11-11

## 2022-11-18 RX ORDER — DICLOFENAC SODIUM 75 MG/1
TABLET, DELAYED RELEASE ORAL
Qty: 60 TABLET | Refills: 2 | Status: SHIPPED | OUTPATIENT
Start: 2022-11-18

## 2025-07-31 ENCOUNTER — HOSPITAL ENCOUNTER (OUTPATIENT)
Dept: GENERAL RADIOLOGY | Facility: HOSPITAL | Age: 50
Discharge: HOME | End: 2025-07-31
Admitting: NURSE PRACTITIONER
Payer: COMMERCIAL

## 2025-07-31 ENCOUNTER — TRANSCRIBE ORDERS (OUTPATIENT)
Dept: ADMINISTRATIVE | Facility: HOSPITAL | Age: 50
End: 2025-07-31
Payer: COMMERCIAL

## 2025-07-31 DIAGNOSIS — M47.816 LUMBAR SPONDYLOSIS: Primary | ICD-10-CM

## 2025-07-31 DIAGNOSIS — M47.816 LUMBAR SPONDYLOSIS: ICD-10-CM

## 2025-07-31 PROCEDURE — 72114 X-RAY EXAM L-S SPINE BENDING: CPT

## (undated) DEVICE — 1010 S-DRAPE TOWEL DRAPE 10/BX: Brand: STERI-DRAPE™

## (undated) DEVICE — UNDYED BRAIDED (POLYGLACTIN 910), SYNTHETIC ABSORBABLE SUTURE: Brand: COATED VICRYL

## (undated) DEVICE — INTENDED FOR TISSUE SEPARATION, AND OTHER PROCEDURES THAT REQUIRE A SHARP SURGICAL BLADE TO PUNCTURE OR CUT.: Brand: BARD-PARKER ® CARBON RIB-BACK BLADES

## (undated) DEVICE — KT PT POSITION SUPINE HANA/PROFX TABL

## (undated) DEVICE — GLV SURG SENSICARE PI ORTHO SZ8 LF STRL

## (undated) DEVICE — LIGHT SLEEVE: Brand: DEVON

## (undated) DEVICE — STRYKER PERFORMANCE SERIES SAGITTAL BLADE: Brand: STRYKER PERFORMANCE SERIES

## (undated) DEVICE — APPL CHLORAPREP HI/LITE 26ML ORNG

## (undated) DEVICE — STERILE POLYISOPRENE POWDER-FREE SURGICAL GLOVES: Brand: PROTEXIS

## (undated) DEVICE — PAD GRND REM POLYHESIVE A/ DISP

## (undated) DEVICE — FAN SPRAY KIT: Brand: PULSAVAC®

## (undated) DEVICE — ANTIBACTERIAL VIOLET BRAIDED (POLYGLACTIN 910), SYNTHETIC ABSORBABLE SURGICAL SUTURE: Brand: COATED VICRYL

## (undated) DEVICE — Device

## (undated) DEVICE — GAUZE,SPONGE,4"X4",16PLY,STRL,LF,10/TRAY: Brand: MEDLINE

## (undated) DEVICE — SUT MNCRYL PLS ANTIB UD 3/0 PS2 27IN

## (undated) DEVICE — PROXIMATE RH ROTATING HEAD SKIN STAPLERS (35 WIDE) CONTAINS 35 STAINLESS STEEL STAPLES: Brand: PROXIMATE

## (undated) DEVICE — STERILE POLYISOPRENE POWDER-FREE SURGICAL GLOVES WITH EMOLLIENT COATING: Brand: PROTEXIS

## (undated) DEVICE — MAT FLR ABS W/BLU/LINER 56X72IN WHT

## (undated) DEVICE — CVR LEG BOOTLEG F/R NOSKID 33IN

## (undated) DEVICE — 450 ML BOTTLE OF 0.05% CHLORHEXIDINE GLUCONATE IN 99.95% STERILE WATER FOR IRRIGATION, USP AND APPLICATOR.: Brand: IRRISEPT ANTIMICROBIAL WOUND LAVAGE

## (undated) DEVICE — GW TIB BALL NOSE 3MM 100CM

## (undated) DEVICE — ELECTRD BLD EDGE COAT 3IN

## (undated) DEVICE — SUT VIC UD BR COAT 0 CP2 27IN

## (undated) DEVICE — GOWN,REINFORCE,POLY,SIRUS,BREATH SLV,XLG: Brand: MEDLINE

## (undated) DEVICE — SOL IRR NACL 0.9PCT BT 1000ML

## (undated) DEVICE — SUT VIC 2/0 CT1 36IN

## (undated) DEVICE — DRSNG GZ PETROLTM XEROFORM CURAD 1X8IN STRL

## (undated) DEVICE — TOTAL ANTERIOR HIP-LF: Brand: MEDLINE INDUSTRIES, INC.

## (undated) DEVICE — PENCL E/S SMOKEEVAC W/TELESCP CANN

## (undated) DEVICE — GLV SURG SENSICARE PI LF PF 7.5 GRN STRL

## (undated) DEVICE — EXTREMITY-LF: Brand: MEDLINE INDUSTRIES, INC.

## (undated) DEVICE — SUT POLY FORCEFIBER W/CUT/NDL NO5 38IN

## (undated) DEVICE — TOWEL,OR,DSP,ST,BLUE,STD,4/PK,20PK/CS: Brand: MEDLINE

## (undated) DEVICE — GUIDEPIN TEMP THRD 3.2X508MM DISP

## (undated) DEVICE — GLV SURG SENSICARE ORTHO PF LF 7 STRL

## (undated) DEVICE — 3M™ STERI-DRAPE™ U-DRAPE 1015: Brand: STERI-DRAPE™

## (undated) DEVICE — SOL IRR NACL 0.9PCT 3000ML

## (undated) DEVICE — MEDI-VAC NON-CONDUCTIVE SUCTION TUBING: Brand: CARDINAL HEALTH

## (undated) DEVICE — SUT ETHLN 3-0 FS118IN 663H

## (undated) DEVICE — GLV SURG SENSICARE SLT PF LF 7.5 STRL

## (undated) DEVICE — GLV SURG SENSICARE PI PF LF 8.5 GRN STRL

## (undated) DEVICE — ELECTRD BLD EXT EDGE 1P COAT 6.5IN STRL

## (undated) DEVICE — SLV SCD KN/LEN ADJ EXPRSS BLENDED MD 1P/U

## (undated) DEVICE — 6617 IOBAN II PATIENT ISOLATION DRAPE 5/BX,4BX/CS: Brand: STERI-DRAPE™ IOBAN™ 2

## (undated) DEVICE — SUT VIC 3/0 SH 27IN J416H

## (undated) DEVICE — 3M™ IOBAN™ 2 ANTIMICROBIAL INCISE DRAPE 6650EZ: Brand: IOBAN™ 2

## (undated) DEVICE — GLV SURG SENSICARE SLT PF LF 8 STRL

## (undated) DEVICE — BIPOLAR SEALER 23-112-1 AQM 6.0: Brand: AQUAMANTYS™

## (undated) DEVICE — 3M™ STERI-DRAPE™ X-RAY IMAGE INTENSIFIER DRAPE, 10 PER CARTON / 4 CARTONS PER CASE, 1013: Brand: STERI-DRAPE™

## (undated) DEVICE — BNDG ELAS DELUXE REINF 10CM 5MTR STRL

## (undated) DEVICE — BLANKT WARM PACU MISTRAL/AIR PREM REFL A/ 85.8X50IN

## (undated) DEVICE — PULLOVER TOGA, 2X LARGE: Brand: FLYTE, SURGICOOL

## (undated) DEVICE — NDL HYPO ECLPS SFTY 22G 1 1/2IN